# Patient Record
Sex: FEMALE | Race: WHITE | NOT HISPANIC OR LATINO | Employment: UNEMPLOYED | URBAN - METROPOLITAN AREA
[De-identification: names, ages, dates, MRNs, and addresses within clinical notes are randomized per-mention and may not be internally consistent; named-entity substitution may affect disease eponyms.]

---

## 2019-09-18 PROCEDURE — 82360 CALCULUS ASSAY QUANT: CPT

## 2019-09-19 ENCOUNTER — LAB REQUISITION (OUTPATIENT)
Dept: LAB | Facility: HOSPITAL | Age: 54
End: 2019-09-19

## 2019-09-19 DIAGNOSIS — N20.9 URINARY CALCULUS: ICD-10-CM

## 2019-09-26 LAB
CA PHOS CRY STONE QL IR: 20 %
CAHPO4 CRY STONE QL IR: 65 %
COD CRY STONE QL IR: 5 %
COLOR STONE: NORMAL
COM CRY STONE QL IR: 10 %
COMPN STONE: NORMAL
Lab: NORMAL
Lab: NORMAL
NIDUS STONE QL: NORMAL
PATH REPORT.COMMENTS IMP SPEC: NORMAL
SIZE STONE: NORMAL MM
SURFACE CRYSTALS: NORMAL
WT STONE: 230.3 MG

## 2019-10-17 NOTE — TELEPHONE ENCOUNTER
Pt has an appointment coming up in December and requesting refill on Emgality. All previous records have been faxed and are in chart.     Please advise and refill medication.    Thank you.

## 2019-10-17 NOTE — TELEPHONE ENCOUNTER
Patient is needing refill on Emgality sent to [facility] in chart. Please call her with any issues. Patient is scheduled to FU in December.

## 2019-11-21 ENCOUNTER — RESULTS ENCOUNTER (OUTPATIENT)
Dept: FAMILY MEDICINE CLINIC | Facility: CLINIC | Age: 54
End: 2019-11-21

## 2019-11-21 ENCOUNTER — OFFICE VISIT (OUTPATIENT)
Dept: FAMILY MEDICINE CLINIC | Facility: CLINIC | Age: 54
End: 2019-11-21

## 2019-11-21 VITALS
HEART RATE: 81 BPM | TEMPERATURE: 98.6 F | SYSTOLIC BLOOD PRESSURE: 155 MMHG | DIASTOLIC BLOOD PRESSURE: 108 MMHG | RESPIRATION RATE: 16 BRPM | BODY MASS INDEX: 32.61 KG/M2 | HEIGHT: 64 IN | WEIGHT: 191 LBS | OXYGEN SATURATION: 100 %

## 2019-11-21 DIAGNOSIS — Z78.9 HEPATITIS B IMMUNE: ICD-10-CM

## 2019-11-21 DIAGNOSIS — R03.0 ELEVATED BLOOD PRESSURE READING: Primary | ICD-10-CM

## 2019-11-21 DIAGNOSIS — Z12.11 SCREENING FOR COLON CANCER: ICD-10-CM

## 2019-11-21 DIAGNOSIS — N20.0 KIDNEY STONE: ICD-10-CM

## 2019-11-21 DIAGNOSIS — Z13.220 SCREENING FOR LIPID DISORDERS: ICD-10-CM

## 2019-11-21 DIAGNOSIS — Z12.31 SCREENING MAMMOGRAM, ENCOUNTER FOR: ICD-10-CM

## 2019-11-21 PROBLEM — E66.9 OBESITY: Status: ACTIVE | Noted: 2019-11-21

## 2019-11-21 PROBLEM — N20.1 URETERAL STONE: Status: ACTIVE | Noted: 2019-09-14

## 2019-11-21 PROBLEM — J45.909 ASTHMA: Status: ACTIVE | Noted: 2019-11-21

## 2019-11-21 PROBLEM — R78.81 BACTEREMIA DUE TO GRAM-NEGATIVE BACTERIA: Status: ACTIVE | Noted: 2019-09-23

## 2019-11-21 PROBLEM — G43.909 MIGRAINE: Status: ACTIVE | Noted: 2019-11-21

## 2019-11-21 PROBLEM — N13.2 URETERAL STONE WITH HYDRONEPHROSIS: Status: ACTIVE | Noted: 2017-12-30

## 2019-11-21 PROBLEM — N13.30 HYDRONEPHROSIS: Status: ACTIVE | Noted: 2019-09-21

## 2019-11-21 PROBLEM — N13.30 HYDROURETERONEPHROSIS: Status: ACTIVE | Noted: 2019-09-14

## 2019-11-21 LAB
BASOPHILS # BLD AUTO: 0.02 10*3/MM3 (ref 0–0.2)
BASOPHILS NFR BLD AUTO: 0.4 % (ref 0–1.5)
DEPRECATED RDW RBC AUTO: 47.2 FL (ref 37–54)
EOSINOPHIL # BLD AUTO: 0.08 10*3/MM3 (ref 0–0.4)
EOSINOPHIL NFR BLD AUTO: 1.7 % (ref 0.3–6.2)
ERYTHROCYTE [DISTWIDTH] IN BLOOD BY AUTOMATED COUNT: 15.2 % (ref 12.3–15.4)
HCT VFR BLD AUTO: 42.8 % (ref 34–46.6)
HGB BLD-MCNC: 13.6 G/DL (ref 12–15.9)
IMM GRANULOCYTES # BLD AUTO: 0.01 10*3/MM3 (ref 0–0.05)
IMM GRANULOCYTES NFR BLD AUTO: 0.2 % (ref 0–0.5)
LYMPHOCYTES # BLD AUTO: 1.49 10*3/MM3 (ref 0.7–3.1)
LYMPHOCYTES NFR BLD AUTO: 32.5 % (ref 19.6–45.3)
MCH RBC QN AUTO: 27.2 PG (ref 26.6–33)
MCHC RBC AUTO-ENTMCNC: 31.8 G/DL (ref 31.5–35.7)
MCV RBC AUTO: 85.6 FL (ref 79–97)
MONOCYTES # BLD AUTO: 0.42 10*3/MM3 (ref 0.1–0.9)
MONOCYTES NFR BLD AUTO: 9.2 % (ref 5–12)
NEUTROPHILS # BLD AUTO: 2.57 10*3/MM3 (ref 1.7–7)
NEUTROPHILS NFR BLD AUTO: 56 % (ref 42.7–76)
NRBC BLD AUTO-RTO: 0 /100 WBC (ref 0–0.2)
PLATELET # BLD AUTO: 320 10*3/MM3 (ref 140–450)
PMV BLD AUTO: 10.2 FL (ref 6–12)
RBC # BLD AUTO: 5 10*6/MM3 (ref 3.77–5.28)
WBC NRBC COR # BLD: 4.59 10*3/MM3 (ref 3.4–10.8)

## 2019-11-21 PROCEDURE — 36415 COLL VENOUS BLD VENIPUNCTURE: CPT | Performed by: FAMILY MEDICINE

## 2019-11-21 PROCEDURE — 80053 COMPREHEN METABOLIC PANEL: CPT | Performed by: FAMILY MEDICINE

## 2019-11-21 PROCEDURE — 99203 OFFICE O/P NEW LOW 30 MIN: CPT | Performed by: FAMILY MEDICINE

## 2019-11-21 PROCEDURE — 86706 HEP B SURFACE ANTIBODY: CPT | Performed by: FAMILY MEDICINE

## 2019-11-21 PROCEDURE — 85025 COMPLETE CBC W/AUTO DIFF WBC: CPT | Performed by: FAMILY MEDICINE

## 2019-11-21 PROCEDURE — 80061 LIPID PANEL: CPT | Performed by: FAMILY MEDICINE

## 2019-11-21 RX ORDER — ALBUTEROL SULFATE 90 UG/1
AEROSOL, METERED RESPIRATORY (INHALATION)
COMMUNITY
End: 2019-11-21 | Stop reason: SDUPTHER

## 2019-11-21 RX ORDER — HYDROCODONE BITARTRATE AND ACETAMINOPHEN 7.5; 325 MG/1; MG/1
TABLET ORAL
Refills: 0 | COMMUNITY
Start: 2019-10-23 | End: 2019-11-21

## 2019-11-21 RX ORDER — MONTELUKAST SODIUM 10 MG/1
10 TABLET ORAL DAILY
Qty: 90 TABLET | Refills: 1 | Status: SHIPPED | OUTPATIENT
Start: 2019-11-21 | End: 2020-07-20

## 2019-11-21 RX ORDER — PHENAZOPYRIDINE HYDROCHLORIDE 200 MG/1
TABLET, FILM COATED ORAL
COMMUNITY
Start: 2019-09-14 | End: 2019-11-21

## 2019-11-21 RX ORDER — ONDANSETRON 4 MG/1
4 TABLET, FILM COATED ORAL EVERY 8 HOURS PRN
Refills: 1 | COMMUNITY
Start: 2019-10-23 | End: 2019-11-21

## 2019-11-21 RX ORDER — ALBUTEROL SULFATE 90 UG/1
2 AEROSOL, METERED RESPIRATORY (INHALATION) EVERY 6 HOURS PRN
Qty: 18 G | Refills: 0 | Status: SHIPPED | OUTPATIENT
Start: 2019-11-21 | End: 2022-01-25 | Stop reason: SDUPTHER

## 2019-11-21 RX ORDER — CRANBERRY FRUIT EXTRACT 650 MG
2 CAPSULE ORAL 2 TIMES DAILY
COMMUNITY

## 2019-11-21 RX ORDER — MONTELUKAST SODIUM 10 MG/1
1 TABLET ORAL DAILY
COMMUNITY
Start: 2018-10-14 | End: 2019-11-21 | Stop reason: SDUPTHER

## 2019-11-21 RX ORDER — DIPHENHYDRAMINE HCL 25 MG
CAPSULE ORAL
COMMUNITY
End: 2019-11-21

## 2019-11-21 NOTE — PROGRESS NOTES
Subjective   Stormy Morrow is a 54 y.o. female.     Chief Complaint   Patient presents with   • Blood Infection     Patient states that she recently had sepsis and she is F/U from that. She is a new patient today. Her previous doctor was Lory Irizarry in [place].    • Headache   • Flank Pain   • Allergies         Current Outpatient Medications:   •  albuterol sulfate  (90 Base) MCG/ACT inhaler, Inhale 2 puffs Every 6 (Six) Hours As Needed for Wheezing or Shortness of Air., Disp: 18 g, Rfl: 0  •  Cetirizine HCl 10 MG capsule, Take 1 capsule by mouth Daily., Disp: , Rfl:   •  fluticasone (VERAMYST) 27.5 MCG/SPRAY nasal spray, 2 sprays into the nostril(s) as directed by provider Daily., Disp: 30 g, Rfl: 2  •  galcanezumab-gnlm (EMGALITY) 120 MG/ML prefilled syringe, Inject 1 mL under the skin into the appropriate area as directed Every 30 (Thirty) Days., Disp: 1 mL, Rfl: 1  •  montelukast (SINGULAIR) 10 MG tablet, Take 1 tablet by mouth Daily., Disp: 90 tablet, Rfl: 1  •  Nutritional Supplements (THERALITH XR) tablet, Take 2 tablets by mouth 2 (Two) Times a Day., Disp: , Rfl:     Past Medical History:   Diagnosis Date   • Allergic    • Asthma 11/21/2019   • Bell palsy     Rt Side   • Heart murmur    • Kidney stone    • MAMMO     NEG = 2018   • Migraine     MIGRAINES   • Obesity 11/21/2019   • PAP     NEG = 2018   • Raynaud phenomenon        Past Surgical History:   Procedure Laterality Date   • APPENDECTOMY  07/2000   • CYSTOSCOPY BLADDER STONE LITHOTRIPSY  09/2019       Family History   Problem Relation Age of Onset   • Arthritis Mother    • Osteoporosis Mother    • Stroke Mother    • Hypertension Mother    • Arthritis Father    • Mental illness Father         PTSD   • Hyperlipidemia Father    • Kidney disease Father         RTA   • Cancer Father         Tongue Cancer   • Obesity Father    • Hypertension Father    • Kidney nephrosis Father    • Hypertension Brother    • Kidney nephrosis Brother    •  Migraines Daughter    • Hyperlipidemia Son    • Kidney disease Son    • Migraines Maternal Aunt    • Hyperlipidemia Maternal Uncle    • Heart attack Maternal Uncle    • Arthritis Maternal Grandmother    • Cancer Maternal Grandmother    • Obesity Maternal Grandmother    • Cancer Maternal Grandfather    • Stroke Paternal Grandfather        Social History     Socioeconomic History   • Marital status:      Spouse name: Not on file   • Number of children: Not on file   • Years of education: Not on file   • Highest education level: Not on file   Tobacco Use   • Smoking status: Never Smoker   • Smokeless tobacco: Never Used   Substance and Sexual Activity   • Alcohol use: No     Frequency: Never   • Drug use: No   • Sexual activity: Defer       58y/o C female here to [facility] care w/ hx/o recurrent Kidney stones/ Migraines/ Raynauds/ Allergies/ Bell's Palsy    Pt w/ recent hx/o sepsis ([place]) due to kidney stones and needing a PCP    UROLOGY---Dr. Frost  NEURO---Dr. Dasilva           The following portions of the patient's history were reviewed and updated as appropriate: allergies, current medications, past family history, past medical history, past social history, past surgical history and problem list.    Review of Systems   Constitutional: Negative for activity change, fatigue and unexpected weight gain.   Respiratory: Negative for cough, chest tightness and shortness of breath.    Cardiovascular: Negative for chest pain, palpitations and leg swelling.   Genitourinary: Negative for difficulty urinating, flank pain, frequency, hematuria, urgency and urinary incontinence.   Musculoskeletal: Negative for arthralgias and myalgias.   Allergic/Immunologic: Positive for environmental allergies.   Neurological: Positive for facial asymmetry (Rt sided Bell's Palsy Hx) and headache. Negative for dizziness, speech difficulty, weakness, light-headedness, memory problem and confusion.   Psychiatric/Behavioral: Negative for sleep  disturbance.       Vitals:    11/21/19 1004   BP: (!) 155/108   Pulse: 81   Resp: 16   Temp: 98.6 °F (37 °C)   SpO2: 100%       Objective   Physical Exam   Constitutional: She is oriented to person, place, and time. She appears well-developed and well-nourished.   HENT:   Head: Normocephalic and atraumatic.   Neck: Normal range of motion. Neck supple.   Cardiovascular: Normal rate, regular rhythm, normal heart sounds and intact distal pulses.   No murmur heard.  Pulmonary/Chest: Effort normal and breath sounds normal. No respiratory distress.   Musculoskeletal: She exhibits no edema.   Neurological: She is alert and oriented to person, place, and time. No cranial nerve deficit.   Skin: Skin is warm and dry. No rash noted.   Psychiatric: She has a normal mood and affect. Her behavior is normal. Judgment and thought content normal.   Nursing note and vitals reviewed.        Assessment/Plan   Stormy was seen today for blood infection, headache, flank pain and allergies.    Diagnoses and all orders for this visit:    Elevated blood pressure reading  -     Comprehensive Metabolic Panel  -     CBC & Differential  -     CBC Auto Differential    Hepatitis B immune  -     Hepatitis B Surface Antibody    Kidney stone    Screening mammogram, encounter for  -     Mammo Screening Digital Tomosynthesis Bilateral With CAD; Future    Screening for colon cancer  -     Cologuard - Stool, Per Rectum; Future    Screening for lipid disorders  -     Comprehensive Metabolic Panel  -     Lipid Panel    Other orders  -     fluticasone (VERAMYST) 27.5 MCG/SPRAY nasal spray; 2 sprays into the nostril(s) as directed by provider Daily.  -     montelukast (SINGULAIR) 10 MG tablet; Take 1 tablet by mouth Daily.  -     albuterol sulfate  (90 Base) MCG/ACT inhaler; Inhale 2 puffs Every 6 (Six) Hours As Needed for Wheezing or Shortness of Air.

## 2019-11-22 LAB
ALBUMIN SERPL-MCNC: 4.3 G/DL (ref 3.5–5.2)
ALBUMIN/GLOB SERPL: 1.4 G/DL
ALP SERPL-CCNC: 81 U/L (ref 39–117)
ALT SERPL W P-5'-P-CCNC: 21 U/L (ref 1–33)
ANION GAP SERPL CALCULATED.3IONS-SCNC: 11.1 MMOL/L (ref 5–15)
AST SERPL-CCNC: 21 U/L (ref 1–32)
BILIRUB SERPL-MCNC: 0.3 MG/DL (ref 0.2–1.2)
BUN BLD-MCNC: 11 MG/DL (ref 6–20)
BUN/CREAT SERPL: 12.5 (ref 7–25)
CALCIUM SPEC-SCNC: 9.2 MG/DL (ref 8.6–10.5)
CHLORIDE SERPL-SCNC: 107 MMOL/L (ref 98–107)
CHOLEST SERPL-MCNC: 191 MG/DL (ref 0–200)
CO2 SERPL-SCNC: 25.9 MMOL/L (ref 22–29)
CREAT BLD-MCNC: 0.88 MG/DL (ref 0.57–1)
GFR SERPL CREATININE-BSD FRML MDRD: 67 ML/MIN/1.73
GLOBULIN UR ELPH-MCNC: 3.1 GM/DL
GLUCOSE BLD-MCNC: 96 MG/DL (ref 65–99)
HBV SURFACE AB SER RIA-ACNC: REACTIVE
HDLC SERPL-MCNC: 59 MG/DL (ref 40–60)
LDLC SERPL CALC-MCNC: 122 MG/DL (ref 0–100)
LDLC/HDLC SERPL: 2.06 {RATIO}
POTASSIUM BLD-SCNC: 4.9 MMOL/L (ref 3.5–5.2)
PROT SERPL-MCNC: 7.4 G/DL (ref 6–8.5)
SODIUM BLD-SCNC: 144 MMOL/L (ref 136–145)
TRIGL SERPL-MCNC: 52 MG/DL (ref 0–150)
VLDLC SERPL-MCNC: 10.4 MG/DL (ref 5–40)

## 2019-11-23 PROBLEM — G51.0 BELL PALSY: Status: ACTIVE | Noted: 2019-11-23

## 2019-11-23 PROBLEM — N20.0 KIDNEY STONE: Status: ACTIVE | Noted: 2019-11-23

## 2019-12-03 ENCOUNTER — OFFICE VISIT (OUTPATIENT)
Dept: NEUROLOGY | Facility: CLINIC | Age: 54
End: 2019-12-03

## 2019-12-03 VITALS
HEIGHT: 64 IN | HEART RATE: 81 BPM | WEIGHT: 190 LBS | SYSTOLIC BLOOD PRESSURE: 126 MMHG | DIASTOLIC BLOOD PRESSURE: 74 MMHG | BODY MASS INDEX: 32.44 KG/M2 | OXYGEN SATURATION: 98 %

## 2019-12-03 DIAGNOSIS — G43.109 MIGRAINE WITH AURA AND WITHOUT STATUS MIGRAINOSUS, NOT INTRACTABLE: Primary | ICD-10-CM

## 2019-12-03 PROCEDURE — 99212 OFFICE O/P EST SF 10 MIN: CPT | Performed by: PSYCHIATRY & NEUROLOGY

## 2019-12-03 NOTE — PROGRESS NOTES
Chief Complaint   Patient presents with   • Migraine       Patient ID: Stormy Morrow is a 54 y.o. female.    HPI: I have had the pleasure of seeing your patient today.  As you may know Stormy is a 54-year-old female with a history of migraine headache.  She is currently on Emgality q. monthly.  She has had a lot of success with that from a headache frequency standpoint.  She states that she has had only a few days of headaches within the last few months.  She typically will take naproxen as abortive treatment which is helpful in aborting headaches for her.  She is also taking magnesium as well as vitamin B2.  She denies any new headache symptoms.      The following portions of the patient's history were reviewed and updated as appropriate: allergies, current medications, past family history, past medical history, past social history, past surgical history and problem list.    Review of Systems   Constitutional: Positive for fatigue. Negative for activity change and appetite change.   HENT: Negative for ear pain, hearing loss and trouble swallowing.    Eyes: Positive for photophobia (during migraines ) and visual disturbance (aura).   Respiratory: Positive for shortness of breath. Negative for chest tightness and wheezing.    Gastrointestinal: Negative for abdominal pain, nausea and vomiting.   Endocrine: Positive for cold intolerance. Negative for heat intolerance and polydipsia.   Musculoskeletal: Positive for back pain and neck stiffness. Negative for neck pain.   Skin: Negative for color change, rash and wound.   Allergic/Immunologic: Positive for environmental allergies. Negative for food allergies and immunocompromised state.   Neurological: Positive for facial asymmetry (bell palsy) and headaches (chronic migraines. had a 100 day migraine free and pt states she had her first last week). Negative for dizziness, tremors, seizures, syncope, speech difficulty, weakness, light-headedness and numbness.    Hematological: Negative for adenopathy. Does not bruise/bleed easily.   Psychiatric/Behavioral: Negative for agitation, behavioral problems, confusion, decreased concentration, dysphoric mood, hallucinations, self-injury, sleep disturbance and suicidal ideas. The patient is not nervous/anxious and is not hyperactive.       I reviewed and agree with the above review of systems completed by the medical assistant.    Vitals:    19 1307   BP: 126/74   Pulse: 81   SpO2: 98%       Neurologic Exam     Mental Status   Oriented to person, place, and time.   Concentration: normal.   Level of consciousness: alert  Knowledge: consistent with education (No deficits found.).     Cranial Nerves     CN II   Visual fields full to confrontation.     CN III, IV, VI   Pupils are equal, round, and reactive to light.  Extraocular motions are normal.   CN III: no CN III palsy  CN VI: no CN VI palsy    CN V   Facial sensation intact.     CN VII   Facial expression full, symmetric.     CN VIII   CN VIII normal.     CN IX, X   CN IX normal.   CN X normal.     CN XI   CN XI normal.     CN XII   CN XII normal.     Motor Exam     Strength   Right neck flexion: 5/5  Left neck flexion: 5/5  Right neck extension: 5/5  Left neck extension: 5/5  Right deltoid: 5/5  Left deltoid: 5/5  Right biceps: 5/5  Left biceps: 5/5  Right triceps: 5/5  Left triceps: 5/5  Right wrist flexion: 5/5  Left wrist flexion: 5/5  Right wrist extension: 5/5  Left wrist extension: 5/5  Right interossei: 5/5  Left interossei: 5/5  Right abdominals: 5/5  Left abdominals: 5/5  Right iliopsoas: 5/5  Left iliopsoas: 5/5  Right quadriceps: 5/5  Left quadriceps: 5/5  Right hamstrin/5  Left hamstrin/5  Right glutei: 5/5  Left glutei: 5/5  Right anterior tibial: 5/5  Left anterior tibial: 5/5  Right posterior tibial: 5/5  Left posterior tibial: 5/5  Right peroneal: 5/5  Left peroneal: 5/5  Right gastroc: 5/5  Left gastroc: 5/5    Sensory Exam   Light touch normal.    Vibration normal.     Gait, Coordination, and Reflexes     Gait  Gait: normal    Reflexes   Right brachioradialis: 2+  Left brachioradialis: 2+  Right biceps: 2+  Left biceps: 2+  Right triceps: 2+  Left triceps: 2+  Right patellar: 2+  Left patellar: 2+  Right achilles: 2+  Left achilles: 2+  Right : 2+  Left : 2+Station is normal.       Physical Exam   Constitutional: She is oriented to person, place, and time. She appears well-developed and well-nourished.   HENT:   Head: Normocephalic and atraumatic.   Eyes: EOM are normal. Pupils are equal, round, and reactive to light.   Cardiovascular: Normal rate and regular rhythm.   Pulmonary/Chest: Breath sounds normal.   Musculoskeletal: Normal range of motion.   Neurological: She is oriented to person, place, and time. Gait normal.   Reflex Scores:       Tricep reflexes are 2+ on the right side and 2+ on the left side.       Bicep reflexes are 2+ on the right side and 2+ on the left side.       Brachioradialis reflexes are 2+ on the right side and 2+ on the left side.       Patellar reflexes are 2+ on the right side and 2+ on the left side.       Achilles reflexes are 2+ on the right side and 2+ on the left side.  Skin: Skin is warm.   Vitals reviewed.      Procedures    Assessment/Plan: We will continue her current treatment regimen with Emgality along with the naproxen.  Return to clinic in 6 months.       Stormy was seen today for migraine.    Diagnoses and all orders for this visit:    Migraine with aura and without status migrainosus, not intractable           Chuy Dasilva II, MD

## 2019-12-09 ENCOUNTER — OFFICE VISIT (OUTPATIENT)
Dept: FAMILY MEDICINE CLINIC | Facility: CLINIC | Age: 54
End: 2019-12-09

## 2019-12-09 VITALS
SYSTOLIC BLOOD PRESSURE: 146 MMHG | WEIGHT: 191 LBS | HEART RATE: 73 BPM | DIASTOLIC BLOOD PRESSURE: 99 MMHG | HEIGHT: 64 IN | TEMPERATURE: 98.7 F | OXYGEN SATURATION: 100 % | BODY MASS INDEX: 32.61 KG/M2 | RESPIRATION RATE: 16 BRPM

## 2019-12-09 DIAGNOSIS — R03.0 ELEVATED BLOOD PRESSURE READING: ICD-10-CM

## 2019-12-09 DIAGNOSIS — J45.20 MILD INTERMITTENT ASTHMA WITHOUT COMPLICATION: ICD-10-CM

## 2019-12-09 DIAGNOSIS — Z00.00 PREVENTATIVE HEALTH CARE: Primary | ICD-10-CM

## 2019-12-09 LAB
BILIRUB BLD-MCNC: NEGATIVE MG/DL
CLARITY, POC: CLEAR
COLOR UR: YELLOW
GLUCOSE UR STRIP-MCNC: NEGATIVE MG/DL
KETONES UR QL: NEGATIVE
LEUKOCYTE EST, POC: NEGATIVE
NITRITE UR-MCNC: NEGATIVE MG/ML
PH UR: 6.5 [PH] (ref 5–8)
PROT UR STRIP-MCNC: NEGATIVE MG/DL
RBC # UR STRIP: NEGATIVE /UL
SP GR UR: 1.02 (ref 1–1.03)
UROBILINOGEN UR QL: NORMAL

## 2019-12-09 PROCEDURE — 99396 PREV VISIT EST AGE 40-64: CPT | Performed by: FAMILY MEDICINE

## 2019-12-09 PROCEDURE — 81003 URINALYSIS AUTO W/O SCOPE: CPT | Performed by: FAMILY MEDICINE

## 2019-12-09 NOTE — PROGRESS NOTES
Subjective   Stormy Morrow is a 54 y.o. female.     Chief Complaint   Patient presents with   • Annual Exam     physical with papsmear         Current Outpatient Medications:   •  albuterol sulfate  (90 Base) MCG/ACT inhaler, Inhale 2 puffs Every 6 (Six) Hours As Needed for Wheezing or Shortness of Air., Disp: 18 g, Rfl: 0  •  Cetirizine HCl 10 MG capsule, Take 1 capsule by mouth Daily., Disp: , Rfl:   •  fluticasone (VERAMYST) 27.5 MCG/SPRAY nasal spray, 2 sprays into the nostril(s) as directed by provider Daily., Disp: 30 g, Rfl: 2  •  galcanezumab-gnlm (EMGALITY) 120 MG/ML prefilled syringe, Inject 1 mL under the skin into the appropriate area as directed Every 30 (Thirty) Days., Disp: 1 mL, Rfl: 1  •  Magnesium 100 MG capsule, Take  by mouth., Disp: , Rfl:   •  montelukast (SINGULAIR) 10 MG tablet, Take 1 tablet by mouth Daily., Disp: 90 tablet, Rfl: 1  •  Nutritional Supplements (THERALITH XR) tablet, Take 2 tablets by mouth 2 (Two) Times a Day., Disp: , Rfl:     Past Medical History:   Diagnosis Date   • Allergic    • Asthma 11/21/2019   • Bell palsy     Rt Side   • Heart murmur    • Kidney stone    • MAMMO     NEG = 2018   • Migraine     MIGRAINES   • Obesity 11/21/2019   • PAP     NEG = 2018   • Raynaud phenomenon        Past Surgical History:   Procedure Laterality Date   • APPENDECTOMY  07/2000   • APPENDECTOMY      20 years ago    • COLONOSCOPY      Cologuard ----2019   • CYSTOSCOPY BLADDER STONE LITHOTRIPSY  09/2019    x 2       Family History   Problem Relation Age of Onset   • Arthritis Mother    • Osteoporosis Mother    • Stroke Mother    • Hypertension Mother    • Arthritis Father    • Mental illness Father         PTSD   • Hyperlipidemia Father    • Kidney disease Father         RTA   • Cancer Father         Tongue Cancer   • Obesity Father    • Hypertension Father    • Kidney nephrosis Father    • Hypertension Brother    • Kidney nephrosis Brother    • Migraines Daughter    •  Hyperlipidemia Son    • Kidney disease Son    • Migraines Maternal Aunt    • Hyperlipidemia Maternal Uncle    • Heart attack Maternal Uncle    • Arthritis Maternal Grandmother    • Cancer Maternal Grandmother    • Obesity Maternal Grandmother    • Cancer Maternal Grandfather    • Stroke Paternal Grandfather        Social History     Socioeconomic History   • Marital status:      Spouse name: Not on file   • Number of children: Not on file   • Years of education: Not on file   • Highest education level: Not on file   Tobacco Use   • Smoking status: Never Smoker   • Smokeless tobacco: Never Used   Substance and Sexual Activity   • Alcohol use: No     Frequency: Never   • Drug use: No   • Sexual activity: Defer       53 y/o C female here for annual PE w/ pap    BP good @ [facility]UR[facility] appt last week       The following portions of the patient's history were reviewed and updated as appropriate: allergies, current medications, past family history, past medical history, past social history, past surgical history and problem list.    Review of Systems   Constitutional: Negative for activity change, appetite change, fatigue, fever, unexpected weight gain and unexpected weight loss.   HENT: Negative for congestion, ear pain, hearing loss, nosebleeds, postnasal drip, rhinorrhea, sinus pressure, sneezing, sore throat, tinnitus and trouble swallowing.    Eyes: Negative for blurred vision and double vision.   Respiratory: Negative for cough and shortness of breath.    Cardiovascular: Negative for chest pain.   Gastrointestinal: Negative for abdominal pain, constipation, diarrhea, nausea, vomiting, GERD and indigestion.   Genitourinary: Negative for breast lump, breast pain, difficulty urinating, dysuria, flank pain, frequency, hematuria, urgency, urinary incontinence and vaginal discharge.   Musculoskeletal: Negative for arthralgias, back pain and myalgias.   Skin: Negative for rash and skin lesions.   Neurological: Negative for  weakness, memory problem and confusion.   Psychiatric/Behavioral: Negative for agitation, self-injury, suicidal ideas, depressed mood and stress. The patient is not nervous/anxious.        Vitals:    12/09/19 0915   BP: 146/99   Pulse: 73   Resp: 16   Temp: 98.7 °F (37.1 °C)   SpO2: 100%       Objective   Physical Exam   Constitutional: She is oriented to person, place, and time. She appears well-developed and well-nourished.   HENT:   Head: Normocephalic and atraumatic.   Right Ear: External ear normal.   Left Ear: External ear normal.   Nose: Nose normal.   Mouth/Throat: Oropharynx is clear and moist.   Eyes: Pupils are equal, round, and reactive to light. Conjunctivae and EOM are normal.   Neck: Normal range of motion. Neck supple. No thyromegaly present.   Cardiovascular: Normal rate, regular rhythm, normal heart sounds and intact distal pulses.   No murmur heard.  Pulmonary/Chest: Effort normal and breath sounds normal. No stridor. No respiratory distress. She has no wheezes. She has no rales.   Abdominal: Soft. Bowel sounds are normal. She exhibits no distension and no mass. There is no tenderness. There is no rebound and no guarding. No hernia. Hernia confirmed negative in the right inguinal area and confirmed negative in the left inguinal area.   Genitourinary: Vagina normal, uterus normal and cervix normal. Pelvic exam was performed with patient supine. There is no rash, lesion or Bartholin's cyst on the right labia. There is no rash, lesion or Bartholin's cyst on the left labia. Uterus is midaxial. Right adnexum displays no mass, no tenderness and no fullness. Left adnexum displays no mass, no tenderness and no fullness. Vagina exhibits no lesion and no loss of rugae. No erythema, tenderness or bleeding in the vagina. No foreign body in the vagina. No vaginal discharge found. No cystocele or rectocele present.  Musculoskeletal: Normal range of motion. She exhibits no edema or tenderness.   Lymphadenopathy:      She has no cervical adenopathy.        Right: No inguinal adenopathy present.        Left: No inguinal adenopathy present.   Neurological: She is alert and oriented to person, place, and time. She displays normal reflexes. No cranial nerve deficit. She exhibits normal muscle tone.   Skin: Skin is warm and dry. Capillary refill takes less than 2 seconds. No rash noted. No erythema.   Psychiatric: She has a normal mood and affect. Judgment and thought content normal.   Nursing note and vitals reviewed.        Assessment/Plan   Stormy was seen today for annual exam.    Diagnoses and all orders for this visit:    Preventative health care  -     POCT urinalysis dipstick, automated  -     PapIG HPV Age Gdln ACOG; Future  -     PapIG HPV Age Gdln ACOG    Elevated blood pressure reading    Mild intermittent asthma without complication    Disc'd healthy eating and exercise

## 2019-12-10 ENCOUNTER — HOSPITAL ENCOUNTER (OUTPATIENT)
Dept: MAMMOGRAPHY | Facility: HOSPITAL | Age: 54
Discharge: HOME | End: 2019-12-10
Admitting: FAMILY MEDICINE

## 2019-12-10 DIAGNOSIS — Z12.31 SCREENING MAMMOGRAM, ENCOUNTER FOR: ICD-10-CM

## 2019-12-10 PROCEDURE — 77063 BREAST TOMOSYNTHESIS BI: CPT

## 2019-12-10 PROCEDURE — 77067 SCR MAMMO BI INCL CAD: CPT

## 2019-12-11 ENCOUNTER — HOSPITAL ENCOUNTER (OUTPATIENT)
Dept: OTHER | Facility: HOSPITAL | Age: 54
Discharge: HOME | End: 2019-12-11

## 2019-12-11 DIAGNOSIS — Z00.6 EXAMINATION FOR NORMAL COMPARISON OR CONTROL IN CLINICAL RESEARCH: ICD-10-CM

## 2019-12-11 LAB
AGE GDLN ACOG TESTING: NORMAL
CHROM ANALY OVERALL INTERP-IMP: NORMAL
CONV .: NORMAL
CONV PERFORMED BY:: NORMAL
DX ICD CODE: NORMAL
HIV 1 & 2 AB SER-IMP: NORMAL
HPV I/H RISK 1 DNA CVX QL PROBE+SIG AMP: NEGATIVE
REF LAB TEST METHOD: NORMAL
STAT OF ADQ CVX/VAG CYTO-IMP: NORMAL

## 2019-12-19 ENCOUNTER — TELEPHONE (OUTPATIENT)
Dept: NEUROLOGY | Facility: CLINIC | Age: 54
End: 2019-12-19

## 2019-12-19 DIAGNOSIS — G43.109 MIGRAINE WITH AURA AND WITHOUT STATUS MIGRAINOSUS, NOT INTRACTABLE: Primary | ICD-10-CM

## 2019-12-19 NOTE — TELEPHONE ENCOUNTER
Pt requesting Emgality 120mg/ml injection. Inject 1 ML sub q monthly.    Medication sent to local [facility].

## 2020-06-16 DIAGNOSIS — G43.109 MIGRAINE WITH AURA, NOT INTRACTABLE, WITHOUT STATUS MIGRAINOSUS: ICD-10-CM

## 2020-06-16 RX ORDER — GALCANEZUMAB 120 MG/ML
INJECTION, SOLUTION SUBCUTANEOUS
Qty: 1 PEN | Refills: 1 | Status: SHIPPED | OUTPATIENT
Start: 2020-06-16 | End: 2020-08-04

## 2020-07-08 ENCOUNTER — OFFICE VISIT (OUTPATIENT)
Dept: NEUROLOGY | Facility: CLINIC | Age: 55
End: 2020-07-08

## 2020-07-08 VITALS — BODY MASS INDEX: 35.85 KG/M2 | HEIGHT: 64 IN | WEIGHT: 210 LBS

## 2020-07-08 DIAGNOSIS — G43.109 MIGRAINE WITH AURA, NOT INTRACTABLE, WITHOUT STATUS MIGRAINOSUS: Primary | ICD-10-CM

## 2020-07-08 PROCEDURE — 99213 OFFICE O/P EST LOW 20 MIN: CPT | Performed by: PSYCHIATRY & NEUROLOGY

## 2020-07-08 NOTE — PROGRESS NOTES
Chief Complaint   Patient presents with   • Migraine       Patient ID: Stormy Morrow is a 54 y.o. female.    HPI: I had the pleasure of seeing your patient today.  As you may know Stormy is a 54-year-old female with a history of migraine headache.  She is currently receiving Emgality injections monthly as preventative therapy.  This overall has helped with respect to her headache frequency and severity.  Within the last 30 days she suspects she has had about 5 to 10 days of headaches.  All these were migrainous.  She typically takes Aleve as abortive treatment.  The Aleve is not as effective in aborting the headache as it had been in the past.  She has tried rizatriptan, frovatriptan, Zomig orally as well as Toradol and Relpax in the past.  She denies any new headache symptoms.  Her headaches can last anywhere from 4 or more hours each.  She notes that weather changes as well as menstrual cycle can trigger headaches.  No new symptoms with respect to migraine onset currently.    The following portions of the patient's history were reviewed and updated as appropriate: allergies, current medications, past family history, past medical history, past social history, past surgical history and problem list.    Review of Systems   Constitutional: Negative for activity change, appetite change and fatigue.   HENT: Negative for facial swelling, hearing loss and trouble swallowing.    Eyes: Negative for photophobia, redness and visual disturbance.   Respiratory: Negative for chest tightness, shortness of breath and wheezing.    Cardiovascular: Negative for chest pain, palpitations and leg swelling.   Gastrointestinal: Negative for abdominal pain, nausea and vomiting.   Endocrine: Negative for cold intolerance, heat intolerance and polydipsia.   Musculoskeletal: Positive for gait problem (balance issues  with migraines). Negative for back pain and neck pain.   Skin: Negative for color change, rash and wound.    Allergic/Immunologic: Negative for environmental allergies, food allergies and immunocompromised state.   Neurological: Positive for weakness, numbness (side of right face. sometimes whole face) and headaches (pt states an incease in migraines). Negative for dizziness, tremors, seizures, syncope, facial asymmetry, speech difficulty and light-headedness.   Hematological: Negative for adenopathy. Does not bruise/bleed easily.   Psychiatric/Behavioral: Negative for agitation, behavioral problems, confusion, decreased concentration, dysphoric mood, hallucinations, self-injury, sleep disturbance and suicidal ideas. The patient is not nervous/anxious and is not hyperactive.       I have reviewed the review of systems above performed by my medical assistant.      There were no vitals filed for this visit.    Neurologic Exam     Mental Status   Oriented to person, place, and time.   Concentration: normal.   Level of consciousness: alert  Knowledge: consistent with education (No deficits found.).     Cranial Nerves     CN II   Visual fields full to confrontation.     CN III, IV, VI   Pupils are equal, round, and reactive to light.  Extraocular motions are normal.   CN III: no CN III palsy  CN VI: no CN VI palsy    CN V   Facial sensation intact.     CN VII   Facial expression full, symmetric.     CN VIII   CN VIII normal.     CN IX, X   CN IX normal.   CN X normal.     CN XI   CN XI normal.     CN XII   CN XII normal.     Motor Exam     Strength   Right neck flexion: 5/5  Left neck flexion: 5/5  Right neck extension: 5/5  Left neck extension: 5/5  Right deltoid: 5/5  Left deltoid: 5/5  Right biceps: 5/5  Left biceps: 5/5  Right triceps: 5/5  Left triceps: 5/5  Right wrist flexion: 5/5  Left wrist flexion: 5/5  Right wrist extension: 5/5  Left wrist extension: 5/5  Right interossei: 5/5  Left interossei: 5/5  Right abdominals: 5/5  Left abdominals: 5/5  Right iliopsoas: 5/5  Left iliopsoas: 5/5  Right quadriceps: 5/5  Left  quadriceps: 5/5  Right hamstrin/5  Left hamstrin/5  Right glutei: 5/5  Left glutei: 5/5  Right anterior tibial: 5/5  Left anterior tibial: 5/5  Right posterior tibial: 5/5  Left posterior tibial: 5/5  Right peroneal: 5/5  Left peroneal: 5/5  Right gastroc: 5/5  Left gastroc: 5/5    Sensory Exam   Light touch normal.   Vibration normal.     Gait, Coordination, and Reflexes     Gait  Gait: normal    Reflexes   Right brachioradialis: 2+  Left brachioradialis: 2+  Right biceps: 2+  Left biceps: 2+  Right triceps: 2+  Left triceps: 2+  Right patellar: 2+  Left patellar: 2+  Right achilles: 2+  Left achilles: 2+  Right : 2+  Left : 2+Station is normal.       Physical Exam   Constitutional: She is oriented to person, place, and time. She appears well-developed and well-nourished.   HENT:   Head: Normocephalic and atraumatic.   Eyes: Pupils are equal, round, and reactive to light. EOM are normal.   Cardiovascular: Normal rate and regular rhythm.   Pulmonary/Chest: Breath sounds normal.   Musculoskeletal: Normal range of motion.   Neurological: She is oriented to person, place, and time. Gait normal.   Reflex Scores:       Tricep reflexes are 2+ on the right side and 2+ on the left side.       Bicep reflexes are 2+ on the right side and 2+ on the left side.       Brachioradialis reflexes are 2+ on the right side and 2+ on the left side.       Patellar reflexes are 2+ on the right side and 2+ on the left side.       Achilles reflexes are 2+ on the right side and 2+ on the left side.  Skin: Skin is warm.   Vitals reviewed.      Procedures    Assessment/Plan: Our plan is to try Ubrelvy as abortive treatment for the migrainous headaches..  She will continue Emgality for now.  Return to clinic in 4 months or sooner if needed.       Stormy was seen today for migraine.    Diagnoses and all orders for this visit:    Migraine with aura, not intractable, without status migrainosus  -     ubrogepant (ubrogepant) 50 MG  tablet; Take 1 tablet by mouth 1 (One) Time As Needed (Migraine onset.) for up to 30 days. Repeat x1 in 2 hours if needed           Chuy Dasilva II, MD

## 2020-07-20 RX ORDER — MONTELUKAST SODIUM 10 MG/1
TABLET ORAL
Qty: 90 TABLET | Refills: 0 | Status: SHIPPED | OUTPATIENT
Start: 2020-07-20 | End: 2020-10-19

## 2020-07-29 ENCOUNTER — TELEPHONE (OUTPATIENT)
Dept: NEUROLOGY | Facility: CLINIC | Age: 55
End: 2020-07-29

## 2020-07-29 NOTE — TELEPHONE ENCOUNTER
I called and spoke to pt. I went over with her all steps of emgality pen. She states that it still  not working. I told her to try and call pharmacy to see if they can give you a new Injector pen because that one is malfunctioning. We currently do not have samples right now. After she speaks to the pharmacy she is to call back. She just picked up the script yesterday and I don't think insurance will pay for another pen at this time. However she is going to speak to pharmacy first and call back. Please advise thank you.

## 2020-07-29 NOTE — TELEPHONE ENCOUNTER
MARIANA  [phone]    OK TO LEAVE A MESSAGE    [facility] IS GOING TO SEND REPLACEMENT INJECTORS

## 2020-07-29 NOTE — TELEPHONE ENCOUNTER
Pt called  IN AND STATED WHEN SHE WAS TRYING TO ADMINISTER HER EMGALITY  IT DIDN'T WANT TO WORK SHE HAS BEEN USING IT FOR A YEAR WITHOUT INCIDENT .  DOES SHE NEED A NEW PRESCRIPTION OR CAN SHE GET A SAMPLER HER BEST CALL BACK NUMBER [phone]

## 2020-08-04 DIAGNOSIS — G43.109 MIGRAINE WITH AURA, NOT INTRACTABLE, WITHOUT STATUS MIGRAINOSUS: ICD-10-CM

## 2020-08-04 RX ORDER — GALCANEZUMAB 120 MG/ML
INJECTION, SOLUTION SUBCUTANEOUS
Qty: 1 PEN | Refills: 1 | Status: SHIPPED | OUTPATIENT
Start: 2020-08-04 | End: 2020-10-02

## 2020-09-09 PROCEDURE — 82365 CALCULUS SPECTROSCOPY: CPT | Performed by: UROLOGY

## 2020-09-10 ENCOUNTER — LAB REQUISITION (OUTPATIENT)
Dept: LAB | Facility: HOSPITAL | Age: 55
End: 2020-09-10

## 2020-09-10 DIAGNOSIS — N20.1 CALCULUS OF URETER: ICD-10-CM

## 2020-09-22 LAB
CA H2 PHOS DIHYD MFR STONE: 98 %
COLOR STONE: NORMAL
COMPN STONE: NORMAL
HYDROXYAPATITE 24H ENGDIFF UR: 2 %
LABORATORY COMMENT REPORT: NORMAL
Lab: NORMAL
Lab: NORMAL
PHOTO: NORMAL
SIZE STONE: NORMAL MM
SPEC SOURCE SUBJ: NORMAL
STONE ANALYSIS-IMP: NORMAL
WT STONE: 221 MG

## 2020-10-02 DIAGNOSIS — G43.109 MIGRAINE WITH AURA, NOT INTRACTABLE, WITHOUT STATUS MIGRAINOSUS: ICD-10-CM

## 2020-10-02 RX ORDER — GALCANEZUMAB 120 MG/ML
INJECTION, SOLUTION SUBCUTANEOUS
Qty: 1 PEN | Refills: 5 | Status: SHIPPED | OUTPATIENT
Start: 2020-10-02 | End: 2021-04-19

## 2020-10-19 DIAGNOSIS — Z13.220 SCREENING FOR LIPID DISORDERS: Primary | ICD-10-CM

## 2020-10-19 RX ORDER — MONTELUKAST SODIUM 10 MG/1
TABLET ORAL
Qty: 90 TABLET | Refills: 0 | Status: SHIPPED | OUTPATIENT
Start: 2020-10-19 | End: 2021-05-06 | Stop reason: SDUPTHER

## 2020-10-19 NOTE — TELEPHONE ENCOUNTER
Sent to [facility] but may want to  the order to take w/ her as well  Just checking LIPIDS since has had CMP done recently

## 2020-10-19 NOTE — TELEPHONE ENCOUNTER
Last visit:  12/9/19  Next visit: none  Last labs: 9/15/20    Rx requested: Montelukast   Pharmacy: [facility] in [place]

## 2020-11-10 ENCOUNTER — OFFICE VISIT (OUTPATIENT)
Dept: NEUROLOGY | Facility: CLINIC | Age: 55
End: 2020-11-10

## 2020-11-10 VITALS
BODY MASS INDEX: 32.27 KG/M2 | HEART RATE: 77 BPM | SYSTOLIC BLOOD PRESSURE: 126 MMHG | DIASTOLIC BLOOD PRESSURE: 84 MMHG | WEIGHT: 189 LBS | OXYGEN SATURATION: 98 % | HEIGHT: 64 IN

## 2020-11-10 DIAGNOSIS — G43.109 MIGRAINE WITH AURA, NOT INTRACTABLE, WITHOUT STATUS MIGRAINOSUS: Primary | ICD-10-CM

## 2020-11-10 PROCEDURE — 99214 OFFICE O/P EST MOD 30 MIN: CPT | Performed by: PSYCHIATRY & NEUROLOGY

## 2020-11-10 RX ORDER — ONDANSETRON 4 MG/1
TABLET, FILM COATED ORAL
COMMUNITY
Start: 2020-10-15 | End: 2021-03-16

## 2020-11-10 RX ORDER — HYDROCODONE BITARTRATE AND ACETAMINOPHEN 7.5; 325 MG/1; MG/1
TABLET ORAL
COMMUNITY
Start: 2020-10-15 | End: 2021-03-16

## 2020-11-10 NOTE — PROGRESS NOTES
"Chief Complaint   Patient presents with   • Migraine       Patient ID: Stormy Morrow is a 55 y.o. female.    HPI: I have had the pleasure of seeing your patient today.  As you may know she is a 55-year-old female with a history of migraine headaches.  She unfortunately suffered from several kidney stones recently as well as which she described as \"urosepsis\".  She therefore is unable to tell how many days of headache she has had within the last 30days due to being on pain medication.  She says that typically she will have approximately 5 days of migraine within a 30-day timeframe.  She is using Ubrelvy as abortive treatment and Emgality as prevention.  She usually can tell when the Emgality is wearing off when she is due for an injection.  She says that the Ubrelvy does help however does not effectively resolve the headache for her.  She denies any new symptoms or headaches.  No visual symptoms.  No focal weakness or numbness.    The following portions of the patient's history were reviewed and updated as appropriate: allergies, current medications, past family history, past medical history, past social history, past surgical history and problem list.    Review of Systems   Constitutional: Negative for activity change, appetite change and fatigue.   HENT: Negative for facial swelling, hearing loss and trouble swallowing.    Eyes: Negative for photophobia, redness and visual disturbance.   Respiratory: Negative for chest tightness, shortness of breath and wheezing.    Cardiovascular: Negative for chest pain, palpitations and leg swelling.   Gastrointestinal: Negative for abdominal pain, nausea and vomiting.   Neurological: Positive for headaches. Negative for dizziness, tremors, seizures, syncope, facial asymmetry, speech difficulty, weakness, light-headedness and numbness.   Hematological: Negative for adenopathy. Does not bruise/bleed easily.   Psychiatric/Behavioral: Negative for agitation, behavioral " problems, confusion, decreased concentration, dysphoric mood, hallucinations, self-injury, sleep disturbance and suicidal ideas. The patient is not nervous/anxious and is not hyperactive.       I have reviewed the review of systems above performed by my medical assistant.      Vitals:    11/10/20 1009   BP: 126/84   Pulse: 77   SpO2: 98%       Neurologic Exam     Mental Status   Oriented to person, place, and time.   Concentration: normal.   Level of consciousness: alert  Knowledge: consistent with education (No deficits found.).     Cranial Nerves     CN II   Visual fields full to confrontation.     CN III, IV, VI   Pupils are equal, round, and reactive to light.  Extraocular motions are normal.   CN III: no CN III palsy  CN VI: no CN VI palsy    CN V   Facial sensation intact.     CN VII   Facial expression full, symmetric.     CN VIII   CN VIII normal.     CN IX, X   CN IX normal.   CN X normal.     CN XI   CN XI normal.     CN XII   CN XII normal.     Motor Exam     Strength   Right neck flexion: 5/5  Left neck flexion: 5/5  Right neck extension: 5/5  Left neck extension: 5/5  Right deltoid: 5/5  Left deltoid: 5/5  Right biceps: 5/5  Left biceps: 5/5  Right triceps: 5/5  Left triceps: 5/5  Right wrist flexion: 5/5  Left wrist flexion: 5/5  Right wrist extension: 5/5  Left wrist extension: 5/5  Right interossei: 5/5  Left interossei: 5/5  Right abdominals: 5/5  Left abdominals: 5/5  Right iliopsoas: 5/5  Left iliopsoas: 5/5  Right quadriceps: 5/5  Left quadriceps: 5/5  Right hamstrin/5  Left hamstrin/5  Right glutei: 5/5  Left glutei: 5/5  Right anterior tibial: 5/5  Left anterior tibial: 5/5  Right posterior tibial: 5/5  Left posterior tibial: 5/5  Right peroneal: 5/5  Left peroneal: 5/5  Right gastroc: 5/5  Left gastroc: 5/5    Sensory Exam   Light touch normal.   Vibration normal.     Gait, Coordination, and Reflexes     Gait  Gait: normal    Reflexes   Right brachioradialis: 2+  Left brachioradialis:  2+  Right biceps: 2+  Left biceps: 2+  Right triceps: 2+  Left triceps: 2+  Right patellar: 2+  Left patellar: 2+  Right achilles: 2+  Left achilles: 2+  Right : 2+  Left : 2+Station is normal.       Physical Exam  Vitals signs reviewed.   Constitutional:       Appearance: She is well-developed.   HENT:      Head: Normocephalic and atraumatic.   Eyes:      Extraocular Movements: EOM normal.      Pupils: Pupils are equal, round, and reactive to light.   Cardiovascular:      Rate and Rhythm: Normal rate and regular rhythm.   Pulmonary:      Breath sounds: Normal breath sounds.   Musculoskeletal: Normal range of motion.   Skin:     General: Skin is warm.   Neurological:      Mental Status: She is oriented to person, place, and time.      Gait: Gait is intact.      Deep Tendon Reflexes:      Reflex Scores:       Tricep reflexes are 2+ on the right side and 2+ on the left side.       Bicep reflexes are 2+ on the right side and 2+ on the left side.       Brachioradialis reflexes are 2+ on the right side and 2+ on the left side.       Patellar reflexes are 2+ on the right side and 2+ on the left side.       Achilles reflexes are 2+ on the right side and 2+ on the left side.        Procedures    Assessment/Plan: I have given her samples of Nurtec to try as abortive treatment for her next migraine onset.  Along with that she will continue with Emgality injections q. monthly.  We will see her back in 4 months or sooner if needed.  A total of 25 minutes was spent face-to-face with the patient today.  Of that greater than 50% of this time was spent discussing signs and symptoms of migraine headache, patient education, plan of care and prognosis.       Diagnoses and all orders for this visit:    1. Migraine with aura, not intractable, without status migrainosus (Primary)           Chuy Dasilva II, MD

## 2020-11-18 ENCOUNTER — CLINICAL SUPPORT (OUTPATIENT)
Dept: FAMILY MEDICINE CLINIC | Facility: CLINIC | Age: 55
End: 2020-11-18

## 2020-11-18 DIAGNOSIS — Z13.220 SCREENING FOR LIPID DISORDERS: ICD-10-CM

## 2020-11-18 PROCEDURE — 80061 LIPID PANEL: CPT | Performed by: FAMILY MEDICINE

## 2020-11-18 PROCEDURE — 36415 COLL VENOUS BLD VENIPUNCTURE: CPT

## 2020-11-18 PROCEDURE — 36415 COLL VENOUS BLD VENIPUNCTURE: CPT | Performed by: FAMILY MEDICINE

## 2020-11-19 LAB
CHOLEST SERPL-MCNC: 182 MG/DL (ref 0–200)
HDLC SERPL-MCNC: 55 MG/DL (ref 40–60)
LDLC SERPL CALC-MCNC: 111 MG/DL (ref 0–100)
LDLC/HDLC SERPL: 1.99 {RATIO}
TRIGL SERPL-MCNC: 88 MG/DL (ref 0–150)
VLDLC SERPL-MCNC: 16 MG/DL (ref 5–40)

## 2021-01-08 ENCOUNTER — HOSPITAL ENCOUNTER (OUTPATIENT)
Dept: OTHER | Facility: HOSPITAL | Age: 56
Discharge: HOME | End: 2021-01-08
Attending: INTERNAL MEDICINE

## 2021-01-18 RX ORDER — FLUTICASONE FUROATE 27.5 UG/1
SPRAY, METERED NASAL
Qty: 10 G | Refills: 0 | Status: SHIPPED | OUTPATIENT
Start: 2021-01-18 | End: 2022-01-25 | Stop reason: SDUPTHER

## 2021-01-18 NOTE — TELEPHONE ENCOUNTER
Last visit: 12/9/19  Next visit: none  Last labs: 11/18/20    Rx requested: Flonase  Pharmacy: [facility] in [place]

## 2021-01-21 RX ORDER — FLUTICASONE FUROATE 27.5 UG/1
SPRAY, METERED NASAL
Qty: 27.3 ML | Refills: 2 | OUTPATIENT
Start: 2021-01-21

## 2021-02-03 ENCOUNTER — HOSPITAL ENCOUNTER (OUTPATIENT)
Dept: VACCINE CLINIC | Facility: HOSPITAL | Age: 56
Discharge: HOME | End: 2021-02-03
Attending: INTERNAL MEDICINE

## 2021-03-01 ENCOUNTER — APPOINTMENT (OUTPATIENT)
Dept: CT IMAGING | Facility: HOSPITAL | Age: 56
End: 2021-03-01

## 2021-03-01 ENCOUNTER — HOSPITAL ENCOUNTER (EMERGENCY)
Facility: HOSPITAL | Age: 56
Discharge: HOME | End: 2021-03-01
Admitting: EMERGENCY MEDICINE

## 2021-03-01 VITALS
WEIGHT: 197.75 LBS | BODY MASS INDEX: 33.76 KG/M2 | HEIGHT: 64 IN | TEMPERATURE: 98.1 F | SYSTOLIC BLOOD PRESSURE: 146 MMHG | RESPIRATION RATE: 14 BRPM | DIASTOLIC BLOOD PRESSURE: 94 MMHG | HEART RATE: 92 BPM | OXYGEN SATURATION: 97 %

## 2021-03-01 DIAGNOSIS — K12.1 ORAL INFLAMMATION: Primary | ICD-10-CM

## 2021-03-01 DIAGNOSIS — R20.2 NUMBNESS AND TINGLING SENSATION OF SKIN: ICD-10-CM

## 2021-03-01 DIAGNOSIS — R20.0 NUMBNESS AND TINGLING SENSATION OF SKIN: ICD-10-CM

## 2021-03-01 LAB
ANION GAP SERPL CALCULATED.3IONS-SCNC: 12 MMOL/L (ref 5–15)
BASOPHILS # BLD AUTO: 0 10*3/MM3 (ref 0–0.2)
BASOPHILS NFR BLD AUTO: 0.6 % (ref 0–1.5)
BUN SERPL-MCNC: 18 MG/DL (ref 6–20)
BUN/CREAT SERPL: 18.8 (ref 7–25)
CALCIUM SPEC-SCNC: 9.1 MG/DL (ref 8.6–10.5)
CHLORIDE SERPL-SCNC: 106 MMOL/L (ref 98–107)
CO2 SERPL-SCNC: 22 MMOL/L (ref 22–29)
CREAT SERPL-MCNC: 0.96 MG/DL (ref 0.57–1)
DEPRECATED RDW RBC AUTO: 45.5 FL (ref 37–54)
EOSINOPHIL # BLD AUTO: 0.2 10*3/MM3 (ref 0–0.4)
EOSINOPHIL NFR BLD AUTO: 4.3 % (ref 0.3–6.2)
ERYTHROCYTE [DISTWIDTH] IN BLOOD BY AUTOMATED COUNT: 14.3 % (ref 12.3–15.4)
GFR SERPL CREATININE-BSD FRML MDRD: 61 ML/MIN/1.73
GLUCOSE SERPL-MCNC: 82 MG/DL (ref 65–99)
HCT VFR BLD AUTO: 44.5 % (ref 34–46.6)
HGB BLD-MCNC: 14.9 G/DL (ref 12–15.9)
LYMPHOCYTES # BLD AUTO: 1.4 10*3/MM3 (ref 0.7–3.1)
LYMPHOCYTES NFR BLD AUTO: 27.4 % (ref 19.6–45.3)
MCH RBC QN AUTO: 29.6 PG (ref 26.6–33)
MCHC RBC AUTO-ENTMCNC: 33.4 G/DL (ref 31.5–35.7)
MCV RBC AUTO: 88.4 FL (ref 79–97)
MONOCYTES # BLD AUTO: 0.4 10*3/MM3 (ref 0.1–0.9)
MONOCYTES NFR BLD AUTO: 8.2 % (ref 5–12)
NEUTROPHILS NFR BLD AUTO: 3.1 10*3/MM3 (ref 1.7–7)
NEUTROPHILS NFR BLD AUTO: 59.5 % (ref 42.7–76)
NRBC BLD AUTO-RTO: 0.2 /100 WBC (ref 0–0.2)
PLATELET # BLD AUTO: 271 10*3/MM3 (ref 140–450)
PMV BLD AUTO: 6.7 FL (ref 6–12)
POTASSIUM SERPL-SCNC: 4.2 MMOL/L (ref 3.5–5.2)
RBC # BLD AUTO: 5.03 10*6/MM3 (ref 3.77–5.28)
SODIUM SERPL-SCNC: 140 MMOL/L (ref 136–145)
WBC # BLD AUTO: 5.3 10*3/MM3 (ref 3.4–10.8)

## 2021-03-01 PROCEDURE — 70491 CT SOFT TISSUE NECK W/DYE: CPT

## 2021-03-01 PROCEDURE — 85025 COMPLETE CBC W/AUTO DIFF WBC: CPT | Performed by: PHYSICIAN ASSISTANT

## 2021-03-01 PROCEDURE — 0 IOPAMIDOL PER 1 ML: Performed by: PHYSICIAN ASSISTANT

## 2021-03-01 PROCEDURE — 96375 TX/PRO/DX INJ NEW DRUG ADDON: CPT

## 2021-03-01 PROCEDURE — 25010000002 METHYLPREDNISOLONE PER 125 MG: Performed by: PHYSICIAN ASSISTANT

## 2021-03-01 PROCEDURE — 99283 EMERGENCY DEPT VISIT LOW MDM: CPT

## 2021-03-01 PROCEDURE — 25010000002 DIPHENHYDRAMINE PER 50 MG: Performed by: PHYSICIAN ASSISTANT

## 2021-03-01 PROCEDURE — 96374 THER/PROPH/DIAG INJ IV PUSH: CPT

## 2021-03-01 PROCEDURE — 99284 EMERGENCY DEPT VISIT MOD MDM: CPT

## 2021-03-01 PROCEDURE — 80048 BASIC METABOLIC PNL TOTAL CA: CPT | Performed by: PHYSICIAN ASSISTANT

## 2021-03-01 RX ORDER — DIPHENHYDRAMINE HCL 25 MG
25-50 CAPSULE ORAL EVERY 6 HOURS PRN
Qty: 30 CAPSULE | Refills: 0 | Status: SHIPPED | OUTPATIENT
Start: 2021-03-01 | End: 2022-02-19

## 2021-03-01 RX ORDER — DIPHENHYDRAMINE HYDROCHLORIDE 50 MG/ML
25 INJECTION INTRAMUSCULAR; INTRAVENOUS ONCE
Status: COMPLETED | OUTPATIENT
Start: 2021-03-01 | End: 2021-03-01

## 2021-03-01 RX ORDER — EPINEPHRINE 0.3 MG/.3ML
0.3 INJECTION SUBCUTANEOUS ONCE
Qty: 1 EACH | Refills: 0 | Status: SHIPPED | OUTPATIENT
Start: 2021-03-01 | End: 2021-03-01

## 2021-03-01 RX ORDER — METHYLPREDNISOLONE SODIUM SUCCINATE 125 MG/2ML
125 INJECTION, POWDER, LYOPHILIZED, FOR SOLUTION INTRAMUSCULAR; INTRAVENOUS ONCE
Status: COMPLETED | OUTPATIENT
Start: 2021-03-01 | End: 2021-03-01

## 2021-03-01 RX ORDER — SODIUM CHLORIDE 0.9 % (FLUSH) 0.9 %
10 SYRINGE (ML) INJECTION AS NEEDED
Status: DISCONTINUED | OUTPATIENT
Start: 2021-03-01 | End: 2021-03-01 | Stop reason: HOSPADM

## 2021-03-01 RX ADMIN — METHYLPREDNISOLONE SODIUM SUCCINATE 125 MG: 125 INJECTION, POWDER, FOR SOLUTION INTRAMUSCULAR; INTRAVENOUS at 08:56

## 2021-03-01 RX ADMIN — IOPAMIDOL 100 ML: 755 INJECTION, SOLUTION INTRAVENOUS at 09:26

## 2021-03-01 RX ADMIN — DIPHENHYDRAMINE HYDROCHLORIDE 25 MG: 50 INJECTION, SOLUTION INTRAMUSCULAR; INTRAVENOUS at 08:57

## 2021-03-01 RX ADMIN — SODIUM CHLORIDE 1000 ML: 9 INJECTION, SOLUTION INTRAVENOUS at 08:55

## 2021-03-04 ENCOUNTER — TELEPHONE (OUTPATIENT)
Dept: NEUROLOGY | Facility: CLINIC | Age: 56
End: 2021-03-04

## 2021-03-04 NOTE — TELEPHONE ENCOUNTER
Provider: DR. AGEE  Caller: MARIANA ROME  Relationship to Patient: SELF      Reason for Call:P CALLING STATING THAT SHE HAD ALLERGIC REACTION THIS PAST WEEKEND AND SHE IS GOING TO FIND OUT Monday FOR ALLERGY TESTING. PT BEEN ON EMGALITY FOR OVER A YEAR NOW. PT DID GO TO THE ED FOR IT. PT DOES TAKE THE EMGAILITY AND SHE WANTED TO LET DR. AGEE KNOW AND SHE WANTED TO SEE IF DR. AGEE HAD ANY INFORMATION FOR HER ABOUT IT OR IF HE WANTED TO SEE WHAT THE ALLERGIST FINDS OUT.      PLEASE CALL HER BACK [phone], IT IS OK IF YOU  NEED TO LEAVE A MESSAGE

## 2021-03-10 ENCOUNTER — HOSPITAL ENCOUNTER (EMERGENCY)
Facility: HOSPITAL | Age: 56
Discharge: HOME | End: 2021-03-10
Admitting: EMERGENCY MEDICINE

## 2021-03-10 VITALS
HEIGHT: 64 IN | DIASTOLIC BLOOD PRESSURE: 85 MMHG | BODY MASS INDEX: 34.29 KG/M2 | TEMPERATURE: 98.5 F | RESPIRATION RATE: 18 BRPM | OXYGEN SATURATION: 99 % | HEART RATE: 87 BPM | SYSTOLIC BLOOD PRESSURE: 127 MMHG | WEIGHT: 200.84 LBS

## 2021-03-10 DIAGNOSIS — T78.40XA ALLERGIC REACTION, INITIAL ENCOUNTER: Primary | ICD-10-CM

## 2021-03-10 LAB
ANION GAP SERPL CALCULATED.3IONS-SCNC: 11 MMOL/L (ref 5–15)
BASOPHILS # BLD AUTO: 0.1 10*3/MM3 (ref 0–0.2)
BASOPHILS NFR BLD AUTO: 0.9 % (ref 0–1.5)
BUN SERPL-MCNC: 23 MG/DL (ref 6–20)
BUN/CREAT SERPL: 20.7 (ref 7–25)
CALCIUM SPEC-SCNC: 8.7 MG/DL (ref 8.6–10.5)
CHLORIDE SERPL-SCNC: 104 MMOL/L (ref 98–107)
CO2 SERPL-SCNC: 23 MMOL/L (ref 22–29)
CREAT SERPL-MCNC: 1.11 MG/DL (ref 0.57–1)
DEPRECATED RDW RBC AUTO: 42.4 FL (ref 37–54)
EOSINOPHIL # BLD AUTO: 0.2 10*3/MM3 (ref 0–0.4)
EOSINOPHIL NFR BLD AUTO: 1.9 % (ref 0.3–6.2)
ERYTHROCYTE [DISTWIDTH] IN BLOOD BY AUTOMATED COUNT: 14.1 % (ref 12.3–15.4)
GFR SERPL CREATININE-BSD FRML MDRD: 51 ML/MIN/1.73
GLUCOSE SERPL-MCNC: 96 MG/DL (ref 65–99)
HCT VFR BLD AUTO: 46.1 % (ref 34–46.6)
HGB BLD-MCNC: 15.6 G/DL (ref 12–15.9)
LYMPHOCYTES # BLD AUTO: 2.1 10*3/MM3 (ref 0.7–3.1)
LYMPHOCYTES NFR BLD AUTO: 21.6 % (ref 19.6–45.3)
MCH RBC QN AUTO: 29.2 PG (ref 26.6–33)
MCHC RBC AUTO-ENTMCNC: 33.8 G/DL (ref 31.5–35.7)
MCV RBC AUTO: 86.4 FL (ref 79–97)
MONOCYTES # BLD AUTO: 0.5 10*3/MM3 (ref 0.1–0.9)
MONOCYTES NFR BLD AUTO: 5.3 % (ref 5–12)
NEUTROPHILS NFR BLD AUTO: 6.9 10*3/MM3 (ref 1.7–7)
NEUTROPHILS NFR BLD AUTO: 70.3 % (ref 42.7–76)
NRBC BLD AUTO-RTO: 0 /100 WBC (ref 0–0.2)
PLATELET # BLD AUTO: 282 10*3/MM3 (ref 140–450)
PMV BLD AUTO: 7.1 FL (ref 6–12)
POTASSIUM SERPL-SCNC: 3.8 MMOL/L (ref 3.5–5.2)
RBC # BLD AUTO: 5.33 10*6/MM3 (ref 3.77–5.28)
SODIUM SERPL-SCNC: 138 MMOL/L (ref 136–145)
WBC # BLD AUTO: 9.8 10*3/MM3 (ref 3.4–10.8)
WHOLE BLOOD HOLD SPECIMEN: NORMAL

## 2021-03-10 PROCEDURE — 85025 COMPLETE CBC W/AUTO DIFF WBC: CPT | Performed by: NURSE PRACTITIONER

## 2021-03-10 PROCEDURE — 99283 EMERGENCY DEPT VISIT LOW MDM: CPT

## 2021-03-10 PROCEDURE — 80048 BASIC METABOLIC PNL TOTAL CA: CPT | Performed by: NURSE PRACTITIONER

## 2021-03-10 PROCEDURE — 25010000002 METHYLPREDNISOLONE PER 125 MG: Performed by: NURSE PRACTITIONER

## 2021-03-10 PROCEDURE — 63710000001 DIPHENHYDRAMINE PER 50 MG: Performed by: NURSE PRACTITIONER

## 2021-03-10 PROCEDURE — 96372 THER/PROPH/DIAG INJ SC/IM: CPT

## 2021-03-10 RX ORDER — NITROGLYCERIN 0.4 MG/1
0.4 TABLET SUBLINGUAL ONCE
Status: DISCONTINUED | OUTPATIENT
Start: 2021-03-10 | End: 2021-03-10

## 2021-03-10 RX ORDER — METHYLPREDNISOLONE SODIUM SUCCINATE 125 MG/2ML
125 INJECTION, POWDER, LYOPHILIZED, FOR SOLUTION INTRAMUSCULAR; INTRAVENOUS ONCE
Status: DISCONTINUED | OUTPATIENT
Start: 2021-03-10 | End: 2021-03-10

## 2021-03-10 RX ORDER — FAMOTIDINE 20 MG/1
20 TABLET, FILM COATED ORAL ONCE
Status: COMPLETED | OUTPATIENT
Start: 2021-03-10 | End: 2021-03-10

## 2021-03-10 RX ORDER — DIPHENHYDRAMINE HCL 25 MG
50 CAPSULE ORAL ONCE
Status: COMPLETED | OUTPATIENT
Start: 2021-03-10 | End: 2021-03-10

## 2021-03-10 RX ORDER — DIPHENHYDRAMINE HYDROCHLORIDE 50 MG/ML
25 INJECTION INTRAMUSCULAR; INTRAVENOUS ONCE
Status: DISCONTINUED | OUTPATIENT
Start: 2021-03-10 | End: 2021-03-10

## 2021-03-10 RX ORDER — FAMOTIDINE 20 MG/1
20 TABLET, FILM COATED ORAL
Status: DISCONTINUED | OUTPATIENT
Start: 2021-03-11 | End: 2021-03-10 | Stop reason: SDUPTHER

## 2021-03-10 RX ORDER — SODIUM CHLORIDE 0.9 % (FLUSH) 0.9 %
10 SYRINGE (ML) INJECTION AS NEEDED
Status: DISCONTINUED | OUTPATIENT
Start: 2021-03-10 | End: 2021-03-11 | Stop reason: HOSPADM

## 2021-03-10 RX ORDER — METHYLPREDNISOLONE SODIUM SUCCINATE 125 MG/2ML
125 INJECTION, POWDER, LYOPHILIZED, FOR SOLUTION INTRAMUSCULAR; INTRAVENOUS ONCE
Status: COMPLETED | OUTPATIENT
Start: 2021-03-10 | End: 2021-03-10

## 2021-03-10 RX ADMIN — METHYLPREDNISOLONE SODIUM SUCCINATE 125 MG: 125 INJECTION, POWDER, FOR SOLUTION INTRAMUSCULAR; INTRAVENOUS at 22:06

## 2021-03-10 RX ADMIN — FAMOTIDINE 20 MG: 20 TABLET ORAL at 22:06

## 2021-03-10 RX ADMIN — DIPHENHYDRAMINE HYDROCHLORIDE 50 MG: 25 CAPSULE ORAL at 22:06

## 2021-03-11 NOTE — ED NOTES
Multiple attempts made to obtain IV access, RN was unsuccessful. NP at bedside notified.      Ramón Delvalle RN  03/10/21 3277

## 2021-03-11 NOTE — DISCHARGE INSTRUCTIONS
Use 50 mg of Benadryl at home and 20 mg of Pepcid if symptoms return.    Use EpiPen you have at home for any difficulty breathing or swallowing.    Follow-up with your primary care for any further problems    Try to avoid any known triggers    Return if worse

## 2021-03-11 NOTE — ED PROVIDER NOTES
Subjective   Patient is a 55-year-old female who states this is her second visit to the emergency room in the last 7 days for allergic reaction.  She states that she thinks it is to chewing gum.  She states that she had allergy testing done earlier this week and it did not show any thing that might be causing her reaction she states they only checked for natural causing things and foods and did not check for any chemicals.  She has a history of Bell's palsy from 20 years ago and has some residual paralysis of the left side of her face.    She denies any pain at this time she denies any difficulty breathing or swallowing.  She states she feels like her tongue is swollen and her right lower lip is swollen.          Review of Systems   Constitutional: Negative for chills, fatigue and fever.   HENT: Negative for congestion, tinnitus and trouble swallowing.    Eyes: Negative for photophobia, discharge and redness.   Respiratory: Negative for cough and shortness of breath.    Cardiovascular: Negative for chest pain and palpitations.   Gastrointestinal: Negative for abdominal pain, diarrhea, nausea and vomiting.   Genitourinary: Negative for dysuria, frequency and urgency.   Musculoskeletal: Negative for back pain, joint swelling and myalgias.   Skin: Negative for rash.   Neurological: Negative for dizziness and headaches.   Psychiatric/Behavioral: Negative for confusion.   All other systems reviewed and are negative.      Past Medical History:   Diagnosis Date   • Allergic    • Asthma 11/21/2019   • Bell palsy     Rt Side   • Heart murmur    • Kidney stone    • MAMMO     NEG = 2018   • Migraine     MIGRAINES   • Obesity 11/21/2019   • PAP     NEG = 2018   • Raynaud phenomenon        Allergies   Allergen Reactions   • Prednisone Mental Status Change     Psychosis   • Latex Rash   • Oxycodone-Acetaminophen Rash     CAN TAKE WITH BENADRYL       Past Surgical History:   Procedure Laterality Date   • APPENDECTOMY  07/2000   •  APPENDECTOMY      20 years ago    • COLONOSCOPY      Cologuard ----2019   • CYSTOSCOPY BLADDER STONE LITHOTRIPSY  09/2019    x 2       Family History   Problem Relation Age of Onset   • Arthritis Mother    • Osteoporosis Mother    • Stroke Mother    • Hypertension Mother    • Arthritis Father    • Mental illness Father         PTSD   • Hyperlipidemia Father    • Kidney disease Father         RTA   • Cancer Father         Tongue Cancer   • Obesity Father    • Hypertension Father    • Kidney nephrosis Father    • Hypertension Brother    • Kidney nephrosis Brother    • Migraines Daughter    • Hyperlipidemia Son    • Kidney disease Son    • Migraines Maternal Aunt    • Hyperlipidemia Maternal Uncle    • Heart attack Maternal Uncle    • Arthritis Maternal Grandmother    • Cancer Maternal Grandmother    • Obesity Maternal Grandmother    • Breast cancer Maternal Grandmother    • Cancer Maternal Grandfather    • Stroke Paternal Grandfather        Social History     Socioeconomic History   • Marital status:      Spouse name: Not on file   • Number of children: Not on file   • Years of education: Not on file   • Highest education level: Not on file   Tobacco Use   • Smoking status: Never Smoker   • Smokeless tobacco: Never Used   Substance and Sexual Activity   • Alcohol use: No   • Drug use: No   • Sexual activity: Defer           Objective   Physical Exam  Vitals reviewed.   Constitutional:       Appearance: Normal appearance. She is well-developed.   HENT:      Head: Normocephalic and atraumatic.      Mouth/Throat:      Lips: Pink.      Mouth: Mucous membranes are moist. No angioedema.      Tongue: No lesions. Tongue does not deviate from midline.      Palate: No mass and lesions.      Pharynx: Oropharynx is clear. Uvula midline. No uvula swelling.      Tonsils: No tonsillar exudate or tonsillar abscesses.     Eyes:      Conjunctiva/sclera: Conjunctivae normal.      Pupils: Pupils are equal, round, and reactive to  "light.   Cardiovascular:      Rate and Rhythm: Normal rate and regular rhythm.      Heart sounds: Normal heart sounds.   Pulmonary:      Effort: Pulmonary effort is normal. No respiratory distress.      Breath sounds: Normal breath sounds. No wheezing.   Abdominal:      General: Bowel sounds are normal. There is no distension.      Palpations: Abdomen is soft. There is no mass.      Tenderness: There is no abdominal tenderness. There is no guarding or rebound.   Musculoskeletal:         General: No deformity. Normal range of motion.      Cervical back: Full passive range of motion without pain, normal range of motion and neck supple.   Skin:     General: Skin is warm and dry.      Capillary Refill: Capillary refill takes less than 2 seconds.   Neurological:      General: No focal deficit present.      Mental Status: She is alert and oriented to person, place, and time.      GCS: GCS eye subscore is 4. GCS verbal subscore is 5. GCS motor subscore is 6.      Cranial Nerves: No cranial nerve deficit.      Sensory: No sensory deficit.      Deep Tendon Reflexes: Reflexes normal.      Comments: Patient has some baseline permanent paralysis on the left side from Bell's palsy some drooping of the left nasolabial fold-   Psychiatric:         Mood and Affect: Mood normal.         Behavior: Behavior normal.         Thought Content: Thought content normal.         Judgment: Judgment normal.         Procedures           ED Course      /97 (Patient Position: Sitting)   Pulse 100   Temp 98.7 °F (37.1 °C) (Oral)   Resp 17   Ht 162.6 cm (64\")   Wt 91.1 kg (200 lb 13.4 oz)   SpO2 96%   BMI 34.47 kg/m²   Labs Reviewed   BASIC METABOLIC PANEL - Abnormal; Notable for the following components:       Result Value    BUN 23 (*)     Creatinine 1.11 (*)     eGFR Non  Amer 51 (*)     All other components within normal limits    Narrative:     GFR Normal >60  Chronic Kidney Disease <60  Kidney Failure <15     CBC WITH AUTO " DIFFERENTIAL - Abnormal; Notable for the following components:    RBC 5.33 (*)     All other components within normal limits   RAINBOW DRAW    Narrative:     The following orders were created for panel order [facility] [place] Draw.  Procedure                               Abnormality         Status                     ---------                               -----------         ------                     Light Blue Top[[id number]]                                                              Green Top (Gel)[[id number]]                                  Final result               Lavender Top[[id number]]                                     Final result               Gold Top - SST[[id number]]                                                                Please view results for these tests on the individual orders.   CBC AND DIFFERENTIAL    Narrative:     The following orders were created for panel order CBC & Differential.  Procedure                               Abnormality         Status                     ---------                               -----------         ------                     CBC Auto Differential[[id number]]        Abnormal            Final result                 Please view results for these tests on the individual orders.   GREEN TOP   LAVENDER TOP   LIGHT BLUE TOP   GOLD TOP - SST     Medications   sodium chloride 0.9 % flush 10 mL (has no administration in time range)   methylPREDNISolone sodium succinate (SOLU-Medrol) injection 125 mg (125 mg Intramuscular Given 3/10/21 2206)   diphenhydrAMINE (BENADRYL) capsule 50 mg (50 mg Oral Given 3/10/21 2206)   famotidine (PEPCID) tablet 20 mg (20 mg Oral Given 3/10/21 2206)     No radiology results for the last day                                       MDM  Number of Diagnoses or Management Options  Diagnosis management comments: Patient had IV ordered and blood work was obtained-IV stick proved to be difficult and patient was given IM and p.o. medication.    On  reevaluation the patient states she feels better.  She still is controlling her secretions she is having no difficulty breathing or swallowing.  She will be discharged home she states she has an EpiPen already at home she was advised how to use Benadryl and Pepcid for further reactions she was advised to try to avoid trigger and to return if worse she verbalized understood discharge instructions       Amount and/or Complexity of Data Reviewed  Clinical lab tests: reviewed    Risk of Complications, Morbidity, and/or Mortality  Presenting problems: minimal  Diagnostic procedures: minimal  Management options: minimal    Patient Progress  Patient progress: improved      Final diagnoses:   Allergic reaction, initial encounter            Cathy Grover, APRN  03/10/21 8854

## 2021-03-16 ENCOUNTER — OFFICE VISIT (OUTPATIENT)
Dept: NEUROLOGY | Facility: CLINIC | Age: 56
End: 2021-03-16

## 2021-03-16 VITALS
BODY MASS INDEX: 34.15 KG/M2 | DIASTOLIC BLOOD PRESSURE: 86 MMHG | SYSTOLIC BLOOD PRESSURE: 134 MMHG | HEIGHT: 64 IN | HEART RATE: 82 BPM | WEIGHT: 200 LBS | OXYGEN SATURATION: 98 %

## 2021-03-16 DIAGNOSIS — G43.109 MIGRAINE WITH AURA, NOT INTRACTABLE, WITHOUT STATUS MIGRAINOSUS: Primary | ICD-10-CM

## 2021-03-16 PROCEDURE — 99213 OFFICE O/P EST LOW 20 MIN: CPT | Performed by: PSYCHIATRY & NEUROLOGY

## 2021-03-16 RX ORDER — FAMOTIDINE 20 MG/1
20 TABLET, FILM COATED ORAL 2 TIMES DAILY PRN
COMMUNITY
End: 2022-02-19

## 2021-03-16 NOTE — PROGRESS NOTES
Chief Complaint   Patient presents with   • Migraine       Patient ID: Stormy Morrow is a 55 y.o. female.    HPI: I had the pleasure of seeing your patient today.  As you may know she is a 55-year-old female with a history of migraine headaches.  She has been doing well from a migraine standpoint since her last visit.  She says that she has had about 2 to 3 days of headaches within the last 30 days.  They were migrainous.  She typically will take naproxen.  She is receiving Emgality injections q. monthly.  She appears to have tolerated it well thus far.  She did have 2 back-to-back episodes of anaphylaxis however earlier this month.  This occurred quite some time after the initiation of the Emgality injections.  She denies any focal weakness or numbness.  No double vision or loss of vision.  She does have some fatigue which has been a chronic issue.    The following portions of the patient's history were reviewed and updated as appropriate: allergies, current medications, past family history, past medical history, past social history, past surgical history and problem list.    Review of Systems   Constitutional: Positive for fatigue (pt stated this is daily ). Negative for activity change and appetite change.   HENT: Negative for facial swelling, hearing loss and trouble swallowing.    Musculoskeletal: Negative for back pain, gait problem and neck pain.   Allergic/Immunologic: Negative for environmental allergies, food allergies and immunocompromised state.   Neurological: Positive for weakness, numbness and headaches. Negative for dizziness, tremors, seizures, syncope, facial asymmetry, speech difficulty and light-headedness.   Hematological: Negative for adenopathy. Does not bruise/bleed easily.   Psychiatric/Behavioral: Negative for agitation, behavioral problems, confusion, decreased concentration, dysphoric mood, hallucinations, self-injury, sleep disturbance and suicidal ideas. The patient is not  nervous/anxious and is not hyperactive.       I have reviewed the review of systems above performed by my medical assistant.      Vitals:    21 1325   BP: 134/86   Pulse: 82   SpO2: 98%       Neurologic Exam     Mental Status   Oriented to person, place, and time.   Concentration: normal.   Level of consciousness: alert  Knowledge: consistent with education (No deficits found.).     Cranial Nerves     CN II   Visual fields full to confrontation.     CN III, IV, VI   Pupils are equal, round, and reactive to light.  Extraocular motions are normal.   CN III: no CN III palsy  CN VI: no CN VI palsy    CN V   Facial sensation intact.     CN VII   Facial expression full, symmetric.     CN VIII   CN VIII normal.     CN IX, X   CN IX normal.   CN X normal.     CN XI   CN XI normal.     CN XII   CN XII normal.     Motor Exam     Strength   Right neck flexion: 5/5  Left neck flexion: 5/5  Right neck extension: 5/5  Left neck extension: 5/5  Right deltoid: 5/5  Left deltoid: 5/5  Right biceps: 5/5  Left biceps: 5/5  Right triceps: 5/5  Left triceps: 5/5  Right wrist flexion: 5/5  Left wrist flexion: 5/5  Right wrist extension: 5/5  Left wrist extension: 5/5  Right interossei: 5/5  Left interossei: 5/5  Right abdominals: 5/5  Left abdominals: 5/5  Right iliopsoas: 5/5  Left iliopsoas: 5/5  Right quadriceps: 5/5  Left quadriceps: 5/5  Right hamstrin/5  Left hamstrin/5  Right glutei: 5/5  Left glutei: 5/5  Right anterior tibial: 5/5  Left anterior tibial: 5/5  Right posterior tibial: 5/5  Left posterior tibial: 5/5  Right peroneal: 5/5  Left peroneal: 5/5  Right gastroc: 5/5  Left gastroc: 5/5    Sensory Exam   Light touch normal.   Vibration normal.     Gait, Coordination, and Reflexes     Gait  Gait: normal    Reflexes   Right brachioradialis: 2+  Left brachioradialis: 2+  Right biceps: 2+  Left biceps: 2+  Right triceps: 2+  Left triceps: 2+  Right patellar: 2+  Left patellar: 2+  Right achilles: 2+  Left  achilles: 2+  Right : 2+  Left : 2+Station is normal.       Physical Exam  Vitals reviewed.   Constitutional:       Appearance: She is well-developed.   HENT:      Head: Normocephalic and atraumatic.   Eyes:      Extraocular Movements: EOM normal.      Pupils: Pupils are equal, round, and reactive to light.   Cardiovascular:      Rate and Rhythm: Normal rate and regular rhythm.   Pulmonary:      Breath sounds: Normal breath sounds.   Musculoskeletal:         General: Normal range of motion.   Skin:     General: Skin is warm.   Neurological:      Mental Status: She is oriented to person, place, and time.      Gait: Gait is intact.      Deep Tendon Reflexes:      Reflex Scores:       Tricep reflexes are 2+ on the right side and 2+ on the left side.       Bicep reflexes are 2+ on the right side and 2+ on the left side.       Brachioradialis reflexes are 2+ on the right side and 2+ on the left side.       Patellar reflexes are 2+ on the right side and 2+ on the left side.       Achilles reflexes are 2+ on the right side and 2+ on the left side.        Procedures    Assessment/Plan: We will continue her current regimen of the Emgality injections monthly along with magnesium daily and naproxen as needed.  Return to clinic in 6 months or sooner if needed.  A total of 25 minutes was spent face-to-face with the patient today.  Of that greater than 50% of this time was spent discussing signs and symptoms of migraine headache, patient education, plan of care and prognosis.       Diagnoses and all orders for this visit:    1. Migraine with aura, not intractable, without status migrainosus (Primary)           Chuy Dasilva II, MD

## 2021-04-18 DIAGNOSIS — G43.109 MIGRAINE WITH AURA, NOT INTRACTABLE, WITHOUT STATUS MIGRAINOSUS: ICD-10-CM

## 2021-04-19 RX ORDER — GALCANEZUMAB 120 MG/ML
INJECTION, SOLUTION SUBCUTANEOUS
Qty: 1 PEN | Refills: 5 | Status: SHIPPED | OUTPATIENT
Start: 2021-04-19 | End: 2021-09-28 | Stop reason: SDUPTHER

## 2021-04-19 RX ORDER — MONTELUKAST SODIUM 10 MG/1
TABLET ORAL
Qty: 90 TABLET | Refills: 0 | OUTPATIENT
Start: 2021-04-19

## 2021-05-05 RX ORDER — MONTELUKAST SODIUM 10 MG/1
TABLET ORAL
Qty: 90 TABLET | Refills: 0 | OUTPATIENT
Start: 2021-05-05

## 2021-05-06 ENCOUNTER — OFFICE VISIT (OUTPATIENT)
Dept: FAMILY MEDICINE CLINIC | Facility: CLINIC | Age: 56
End: 2021-05-06

## 2021-05-06 VITALS
TEMPERATURE: 96.9 F | SYSTOLIC BLOOD PRESSURE: 161 MMHG | OXYGEN SATURATION: 100 % | RESPIRATION RATE: 16 BRPM | BODY MASS INDEX: 35.17 KG/M2 | DIASTOLIC BLOOD PRESSURE: 109 MMHG | HEIGHT: 64 IN | HEART RATE: 93 BPM | WEIGHT: 206 LBS

## 2021-05-06 DIAGNOSIS — Z12.31 SCREENING MAMMOGRAM, ENCOUNTER FOR: ICD-10-CM

## 2021-05-06 DIAGNOSIS — Z88.9 H/O MULTIPLE ALLERGIES: ICD-10-CM

## 2021-05-06 DIAGNOSIS — J45.20 MILD INTERMITTENT ASTHMA WITHOUT COMPLICATION: Primary | ICD-10-CM

## 2021-05-06 DIAGNOSIS — R03.0 ELEVATED BLOOD PRESSURE READING IN OFFICE WITHOUT DIAGNOSIS OF HYPERTENSION: ICD-10-CM

## 2021-05-06 PROBLEM — R78.81 BACTEREMIA DUE TO PSEUDOMONAS: Status: ACTIVE | Noted: 2019-09-23

## 2021-05-06 PROBLEM — B96.5 BACTEREMIA DUE TO PSEUDOMONAS: Status: ACTIVE | Noted: 2019-09-23

## 2021-05-06 PROBLEM — N20.0 KIDNEY STONE: Status: ACTIVE | Noted: 2020-08-31

## 2021-05-06 PROBLEM — A41.9 SEVERE SEPSIS: Status: ACTIVE | Noted: 2020-09-12

## 2021-05-06 PROBLEM — N13.5 URETERAL OBSTRUCTION: Status: ACTIVE | Noted: 2020-09-12

## 2021-05-06 PROBLEM — R65.20 SEVERE SEPSIS: Status: ACTIVE | Noted: 2020-09-12

## 2021-05-06 PROBLEM — N13.30 HYDRONEPHROSIS OF RIGHT KIDNEY: Status: ACTIVE | Noted: 2019-09-21

## 2021-05-06 PROBLEM — N10 ACUTE PYELONEPHRITIS: Status: ACTIVE | Noted: 2020-09-12

## 2021-05-06 PROBLEM — R10.9 FLANK PAIN: Status: ACTIVE | Noted: 2020-08-31

## 2021-05-06 PROBLEM — E87.20 LACTIC ACIDOSIS: Status: ACTIVE | Noted: 2020-09-12

## 2021-05-06 PROCEDURE — 99213 OFFICE O/P EST LOW 20 MIN: CPT | Performed by: FAMILY MEDICINE

## 2021-05-06 RX ORDER — MONTELUKAST SODIUM 10 MG/1
10 TABLET ORAL DAILY
Qty: 90 TABLET | Refills: 0 | Status: SHIPPED | OUTPATIENT
Start: 2021-05-06 | End: 2021-07-28

## 2021-05-06 RX ORDER — MONTELUKAST SODIUM 10 MG/1
10 TABLET ORAL DAILY
Qty: 90 TABLET | Refills: 2 | Status: SHIPPED | OUTPATIENT
Start: 2021-05-06 | End: 2021-05-06 | Stop reason: SDUPTHER

## 2021-05-06 RX ORDER — MONTELUKAST SODIUM 10 MG/1
10 TABLET ORAL DAILY
Qty: 90 TABLET | Refills: 0 | Status: SHIPPED | OUTPATIENT
Start: 2021-05-06 | End: 2021-05-06 | Stop reason: SDUPTHER

## 2021-05-06 NOTE — PROGRESS NOTES
Subjective   Stormy Morrow is a 55 y.o. female.     Chief Complaint   Patient presents with   • Med Refill         Current Outpatient Medications:   •  albuterol sulfate  (90 Base) MCG/ACT inhaler, Inhale 2 puffs Every 6 (Six) Hours As Needed for Wheezing or Shortness of Air., Disp: 18 g, Rfl: 0  •  Cetirizine HCl 10 MG capsule, Take 1 capsule by mouth Daily., Disp: 90 capsule, Rfl: 3  •  diphenhydrAMINE (BENADRYL) 25 mg capsule, Take 1-2 capsules by mouth Every 6 (Six) Hours As Needed for Itching., Disp: 30 capsule, Rfl: 0  •  Emgality 120 MG/ML auto-injector pen, INJECT 1 ML UNDER THE SKIN AS DIRECTED FOR 1 DOSE, Disp: 1 pen, Rfl: 5  •  EPINEPHrine (EPIPEN) 0.3 MG/0.3ML solution auto-injector injection, INJECT 0.3 ML UNDER THE SKIN INTO THE APPROPRIATE AREA AS DIRECTED 1 (ONE) TIME FOR 1 DOSE., Disp: , Rfl:   •  famotidine (PEPCID) 20 MG tablet, Take 20 mg by mouth 2 (Two) Times a Day As Needed for Heartburn., Disp: , Rfl:   •  Flonase Sensimist 27.5 MCG/SPRAY nasal spray, INHALE 2 PUFFS NASALLY AS DIRECTED, Disp: 10 g, Rfl: 0  •  Magnesium 100 MG capsule, Take  by mouth., Disp: , Rfl:   •  montelukast (SINGULAIR) 10 MG tablet, Take 1 tablet by mouth Daily., Disp: 90 tablet, Rfl: 0  •  Nutritional Supplements (THERALITH XR) tablet, Take 2 tablets by mouth 2 (Two) Times a Day., Disp: , Rfl:     Past Medical History:   Diagnosis Date   • Allergic    • Asthma 11/21/2019   • Bell palsy     Rt Side   • Heart murmur    • Kidney stone    • MAMMO     NEG = 2018/ 2019   • Migraine     MIGRAINES   • Obesity 11/21/2019   • PAP     NEG = 2018/ 2019   • Raynaud phenomenon        Past Surgical History:   Procedure Laterality Date   • APPENDECTOMY  07/2000   • APPENDECTOMY      20 years ago    • COLONOSCOPY      Cologuard ----2019= NEG, rech 2022   • CYSTOSCOPY BLADDER STONE LITHOTRIPSY  2019/ 2020    x 2       Family History   Problem Relation Age of Onset   • Arthritis Mother    • Osteoporosis Mother    • Stroke  Mother    • Hypertension Mother    • Arthritis Father    • Mental illness Father         PTSD   • Hyperlipidemia Father    • Kidney disease Father         RTA   • Cancer Father         Tongue Cancer   • Obesity Father    • Hypertension Father    • Kidney nephrosis Father    • Hypertension Brother    • Kidney nephrosis Brother    • Migraines Daughter    • Hyperlipidemia Son    • Kidney disease Son    • Migraines Maternal Aunt    • Hyperlipidemia Maternal Uncle    • Heart attack Maternal Uncle    • Arthritis Maternal Grandmother    • Cancer Maternal Grandmother    • Obesity Maternal Grandmother    • Breast cancer Maternal Grandmother    • Cancer Maternal Grandfather    • Stroke Paternal Grandfather        Social History     Socioeconomic History   • Marital status:      Spouse name: Not on file   • Number of children: Not on file   • Years of education: Not on file   • Highest education level: Not on file   Tobacco Use   • Smoking status: Never Smoker   • Smokeless tobacco: Never Used   Vaping Use   • Vaping Use: Never used   Substance and Sexual Activity   • Alcohol use: No   • Drug use: No   • Sexual activity: Defer       54 y/o C female here for annual visit for Allergies/ Asthma    Pt unhappy that she had to come in despite last office visit 11/ 2019; Pt has had mult issues this yr w/ kidney stones/ sepsis as well as anaphylaxis of ?etiology-----she sees urology and allergist regularly and a neurologist for her HA's    Pt states w/o her singulair, she is at risk for anaphylaxis and the pharmacy never told her she needed to come in to get her Rx; Pt feels her BP is up because she is so mad       The following portions of the patient's history were reviewed and updated as appropriate: allergies, current medications, past family history, past medical history, past social history, past surgical history and problem list.    Review of Systems   Constitutional: Negative for activity change, appetite change,  unexpected weight gain and unexpected weight loss.   HENT: Negative for congestion, postnasal drip, rhinorrhea, sneezing and swollen glands.    Respiratory: Negative for cough, chest tightness, shortness of breath and wheezing.    Neurological: Negative for headache.       Vitals:    05/06/21 0831   BP: (!) 161/109   Pulse: 93   Resp: 16   Temp: 96.9 °F (36.1 °C)   SpO2: 100%       Objective   Physical Exam  Vitals reviewed.   Constitutional:       Appearance: Normal appearance. She is not ill-appearing or toxic-appearing.   Neck:      Vascular: No carotid bruit.   Cardiovascular:      Rate and Rhythm: Normal rate and regular rhythm.      Pulses: Normal pulses.   Pulmonary:      Effort: Pulmonary effort is normal. No respiratory distress.      Breath sounds: No stridor. No wheezing, rhonchi or rales.   Musculoskeletal:      Cervical back: Normal range of motion and neck supple.   Neurological:      Mental Status: She is alert and oriented to person, place, and time.      Cranial Nerves: No cranial nerve deficit.   Psychiatric:         Attention and Perception: Attention normal.         Mood and Affect: Affect is angry.         Speech: Speech normal.         Behavior: Behavior is agitated. Behavior is cooperative.         Thought Content: Thought content normal.         Cognition and Memory: Cognition and memory normal.         Judgment: Judgment normal.           Assessment/Plan   Diagnoses and all orders for this visit:    1. Mild intermittent asthma without complication (Primary)    2. H/O multiple allergies    3. Elevated blood pressure reading in office without diagnosis of hypertension    4. Screening mammogram, encounter for  -     Mammo Screening Digital Tomosynthesis Bilateral With CAD; Future    Other orders  -     Discontinue: montelukast (SINGULAIR) 10 MG tablet; Take 1 tablet by mouth Daily.  Dispense: 90 tablet; Refill: 2  -     Discontinue: montelukast (SINGULAIR) 10 MG tablet; Take 1 tablet by mouth  Daily.  Dispense: 90 tablet; Refill: 0  -     Cetirizine HCl 10 MG capsule; Take 1 capsule by mouth Daily.  Dispense: 90 capsule; Refill: 3  -     montelukast (SINGULAIR) 10 MG tablet; Take 1 tablet by mouth Daily.  Dispense: 90 tablet; Refill: 0    Pt to monitor o/s BP readings and call if >140/90  Meds refilled

## 2021-05-11 ENCOUNTER — TRANSCRIBE ORDERS (OUTPATIENT)
Dept: ADMINISTRATIVE | Facility: HOSPITAL | Age: 56
End: 2021-05-11

## 2021-05-11 DIAGNOSIS — Z12.31 SCREENING MAMMOGRAM FOR HIGH-RISK PATIENT: Primary | ICD-10-CM

## 2021-05-25 ENCOUNTER — HOSPITAL ENCOUNTER (OUTPATIENT)
Dept: MAMMOGRAPHY | Facility: HOSPITAL | Age: 56
Discharge: HOME | End: 2021-05-25
Admitting: FAMILY MEDICINE

## 2021-05-25 DIAGNOSIS — Z12.31 SCREENING MAMMOGRAM, ENCOUNTER FOR: ICD-10-CM

## 2021-05-25 PROCEDURE — 77063 BREAST TOMOSYNTHESIS BI: CPT

## 2021-05-25 PROCEDURE — 77067 SCR MAMMO BI INCL CAD: CPT

## 2021-05-26 ENCOUNTER — TELEPHONE (OUTPATIENT)
Dept: FAMILY MEDICINE CLINIC | Facility: CLINIC | Age: 56
End: 2021-05-26

## 2021-07-28 ENCOUNTER — LAB REQUISITION (OUTPATIENT)
Dept: LAB | Facility: HOSPITAL | Age: 56
End: 2021-07-28

## 2021-07-28 DIAGNOSIS — N20.0 CALCULUS OF KIDNEY: ICD-10-CM

## 2021-07-28 LAB
ANION GAP SERPL CALCULATED.3IONS-SCNC: 9 MMOL/L (ref 5–15)
BUN SERPL-MCNC: 16 MG/DL (ref 6–20)
BUN/CREAT SERPL: 16.5 (ref 7–25)
CALCIUM SPEC-SCNC: 8.9 MG/DL (ref 8.6–10.5)
CHLORIDE SERPL-SCNC: 101 MMOL/L (ref 98–107)
CO2 SERPL-SCNC: 28 MMOL/L (ref 22–29)
CREAT SERPL-MCNC: 0.97 MG/DL (ref 0.57–1)
GFR SERPL CREATININE-BSD FRML MDRD: 60 ML/MIN/1.73
GLUCOSE SERPL-MCNC: 93 MG/DL (ref 65–99)
POTASSIUM SERPL-SCNC: 3.5 MMOL/L (ref 3.5–5.2)
SODIUM SERPL-SCNC: 138 MMOL/L (ref 136–145)

## 2021-07-28 PROCEDURE — 80048 BASIC METABOLIC PNL TOTAL CA: CPT | Performed by: UROLOGY

## 2021-07-28 RX ORDER — MONTELUKAST SODIUM 10 MG/1
TABLET ORAL
Qty: 90 TABLET | Refills: 0 | Status: SHIPPED | OUTPATIENT
Start: 2021-07-28 | End: 2022-01-03

## 2021-07-28 NOTE — TELEPHONE ENCOUNTER
Last visit:  5/6/21  Next visit: none  Last labs: 3/10/21    Rx requested:Singulair   Pharmacy: [facility] [place]

## 2021-09-28 ENCOUNTER — OFFICE VISIT (OUTPATIENT)
Dept: NEUROLOGY | Facility: CLINIC | Age: 56
End: 2021-09-28

## 2021-09-28 VITALS
BODY MASS INDEX: 35.51 KG/M2 | DIASTOLIC BLOOD PRESSURE: 76 MMHG | SYSTOLIC BLOOD PRESSURE: 126 MMHG | RESPIRATION RATE: 16 BRPM | HEIGHT: 64 IN | OXYGEN SATURATION: 97 % | WEIGHT: 208 LBS | HEART RATE: 78 BPM

## 2021-09-28 DIAGNOSIS — G43.109 MIGRAINE WITH AURA, NOT INTRACTABLE, WITHOUT STATUS MIGRAINOSUS: ICD-10-CM

## 2021-09-28 DIAGNOSIS — Z86.69 HISTORY OF BELL'S PALSY: ICD-10-CM

## 2021-09-28 DIAGNOSIS — Z87.442 HISTORY OF KIDNEY STONES: ICD-10-CM

## 2021-09-28 PROCEDURE — 99215 OFFICE O/P EST HI 40 MIN: CPT | Performed by: NURSE PRACTITIONER

## 2021-09-28 RX ORDER — POTASSIUM CHLORIDE 750 MG/1
10 CAPSULE, EXTENDED RELEASE ORAL DAILY
COMMUNITY
Start: 2021-08-02 | End: 2022-05-17 | Stop reason: SDUPTHER

## 2021-09-28 RX ORDER — CHLORTHALIDONE 25 MG/1
12.5 TABLET ORAL DAILY
COMMUNITY
Start: 2021-08-18

## 2021-09-28 RX ORDER — GALCANEZUMAB 120 MG/ML
120 INJECTION, SOLUTION SUBCUTANEOUS
Qty: 1 PEN | Refills: 11 | Status: SHIPPED | OUTPATIENT
Start: 2021-09-28 | End: 2022-01-27 | Stop reason: SDUPTHER
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
## 2021-09-29 ENCOUNTER — TELEPHONE (OUTPATIENT)
Dept: NEUROLOGY | Facility: CLINIC | Age: 56
End: 2021-09-29

## 2021-09-29 NOTE — TELEPHONE ENCOUNTER
Called and left pt VM stating that the emgality would be available for her to  at the [facility] when he's available.

## 2021-11-03 ENCOUNTER — TELEPHONE (OUTPATIENT)
Dept: NEUROLOGY | Facility: CLINIC | Age: 56
End: 2021-11-03

## 2021-11-03 NOTE — TELEPHONE ENCOUNTER
Provider: ESPERANZA  Caller: MARIANA  Relationship to Patient: SHAYLEE  Phone Number: [phone]  Reason for Call: PT CALLED IN W A QUESTION ABOUT [facility] ABOUT EMGALITY NOT BEING COVERED ABOUT GENERIC MEDICATION FIRST AND THAT SHE HAS ALREADY TRIED OTHER GENERICS THAT DID NOT WORK AND THAT THIS WOULD GO INTO EFFECT AS OF 1.1.22     PT ASKED IF THERE ARE SAMPLES AVAIL OF THE EMGALITY AND IF SHE CAN GET SOME.    PLEASE ADVISE.

## 2021-11-04 ENCOUNTER — TELEPHONE (OUTPATIENT)
Dept: NEUROLOGY | Facility: CLINIC | Age: 56
End: 2021-11-04

## 2021-11-04 NOTE — TELEPHONE ENCOUNTER
Called patient to let her know that I did the PA for her Emgality and am waiting for an answer back from that. Also relayed to her that if we can't get it approved in time, we can get a sample loading dose for her until it gets approved. She stated understanding of message.

## 2021-11-05 NOTE — TELEPHONE ENCOUNTER
Received PA back from [facility], it stated that a PA is not required. I will not be able to do another PA until 1/1/22 when her insurance will no longer pay for it.

## 2021-12-12 ENCOUNTER — APPOINTMENT (OUTPATIENT)
Dept: CT IMAGING | Facility: HOSPITAL | Age: 56
End: 2021-12-12

## 2021-12-12 ENCOUNTER — HOSPITAL ENCOUNTER (EMERGENCY)
Facility: HOSPITAL | Age: 56
Discharge: HOME | End: 2021-12-12

## 2021-12-12 ENCOUNTER — HOSPITAL ENCOUNTER (EMERGENCY)
Facility: HOSPITAL | Age: 56
Discharge: HOME | End: 2021-12-12
Admitting: EMERGENCY MEDICINE

## 2021-12-12 VITALS
TEMPERATURE: 98.1 F | BODY MASS INDEX: 34.15 KG/M2 | RESPIRATION RATE: 18 BRPM | HEART RATE: 92 BPM | SYSTOLIC BLOOD PRESSURE: 105 MMHG | HEIGHT: 64 IN | OXYGEN SATURATION: 99 % | WEIGHT: 200 LBS | DIASTOLIC BLOOD PRESSURE: 70 MMHG

## 2021-12-12 DIAGNOSIS — G25.2 COARSE TREMORS: Primary | ICD-10-CM

## 2021-12-12 LAB
ALBUMIN SERPL-MCNC: 4 G/DL (ref 3.5–5.2)
ALBUMIN/GLOB SERPL: 1.3 G/DL
ALP SERPL-CCNC: 94 U/L (ref 39–117)
ALT SERPL W P-5'-P-CCNC: 65 U/L (ref 1–33)
AMPHET+METHAMPHET UR QL: NEGATIVE
ANION GAP SERPL CALCULATED.3IONS-SCNC: 16 MMOL/L (ref 5–15)
AST SERPL-CCNC: 42 U/L (ref 1–32)
BARBITURATES UR QL SCN: NEGATIVE
BASOPHILS # BLD AUTO: 0 10*3/MM3 (ref 0–0.2)
BASOPHILS NFR BLD AUTO: 0.6 % (ref 0–1.5)
BENZODIAZ UR QL SCN: NEGATIVE
BILIRUB SERPL-MCNC: 0.3 MG/DL (ref 0–1.2)
BUN SERPL-MCNC: 17 MG/DL (ref 6–20)
BUN/CREAT SERPL: 18.9 (ref 7–25)
CALCIUM SPEC-SCNC: 9.7 MG/DL (ref 8.6–10.5)
CANNABINOIDS SERPL QL: NEGATIVE
CHLORIDE SERPL-SCNC: 104 MMOL/L (ref 98–107)
CO2 SERPL-SCNC: 19 MMOL/L (ref 22–29)
COCAINE UR QL: NEGATIVE
CREAT SERPL-MCNC: 0.9 MG/DL (ref 0.57–1)
DEPRECATED RDW RBC AUTO: 38.9 FL (ref 37–54)
EOSINOPHIL # BLD AUTO: 0.1 10*3/MM3 (ref 0–0.4)
EOSINOPHIL NFR BLD AUTO: 1.3 % (ref 0.3–6.2)
ERYTHROCYTE [DISTWIDTH] IN BLOOD BY AUTOMATED COUNT: 12.8 % (ref 12.3–15.4)
GFR SERPL CREATININE-BSD FRML MDRD: 65 ML/MIN/1.73
GLOBULIN UR ELPH-MCNC: 3.1 GM/DL
GLUCOSE SERPL-MCNC: 90 MG/DL (ref 65–99)
HCT VFR BLD AUTO: 46.8 % (ref 34–46.6)
HGB BLD-MCNC: 16.2 G/DL (ref 12–15.9)
HOLD SPECIMEN: NORMAL
HOLD SPECIMEN: NORMAL
LYMPHOCYTES # BLD AUTO: 1.4 10*3/MM3 (ref 0.7–3.1)
LYMPHOCYTES NFR BLD AUTO: 25.6 % (ref 19.6–45.3)
MAGNESIUM SERPL-MCNC: 1.8 MG/DL (ref 1.6–2.6)
MCH RBC QN AUTO: 30.4 PG (ref 26.6–33)
MCHC RBC AUTO-ENTMCNC: 34.7 G/DL (ref 31.5–35.7)
MCV RBC AUTO: 87.8 FL (ref 79–97)
METHADONE UR QL SCN: NEGATIVE
MONOCYTES # BLD AUTO: 0.6 10*3/MM3 (ref 0.1–0.9)
MONOCYTES NFR BLD AUTO: 11.6 % (ref 5–12)
NEUTROPHILS NFR BLD AUTO: 3.3 10*3/MM3 (ref 1.7–7)
NEUTROPHILS NFR BLD AUTO: 60.9 % (ref 42.7–76)
NRBC BLD AUTO-RTO: 0.1 /100 WBC (ref 0–0.2)
OPIATES UR QL: NEGATIVE
OXYCODONE UR QL SCN: NEGATIVE
PLATELET # BLD AUTO: 285 10*3/MM3 (ref 140–450)
PMV BLD AUTO: 6.6 FL (ref 6–12)
POTASSIUM SERPL-SCNC: 4.3 MMOL/L (ref 3.5–5.2)
PROT SERPL-MCNC: 7.1 G/DL (ref 6–8.5)
RBC # BLD AUTO: 5.34 10*6/MM3 (ref 3.77–5.28)
SODIUM SERPL-SCNC: 139 MMOL/L (ref 136–145)
TROPONIN T SERPL-MCNC: <0.01 NG/ML (ref 0–0.03)
WBC NRBC COR # BLD: 5.4 10*3/MM3 (ref 3.4–10.8)

## 2021-12-12 PROCEDURE — 80307 DRUG TEST PRSMV CHEM ANLYZR: CPT | Performed by: PHYSICIAN ASSISTANT

## 2021-12-12 PROCEDURE — 96374 THER/PROPH/DIAG INJ IV PUSH: CPT

## 2021-12-12 PROCEDURE — 84484 ASSAY OF TROPONIN QUANT: CPT | Performed by: PHYSICIAN ASSISTANT

## 2021-12-12 PROCEDURE — 70450 CT HEAD/BRAIN W/O DYE: CPT

## 2021-12-12 PROCEDURE — 99283 EMERGENCY DEPT VISIT LOW MDM: CPT

## 2021-12-12 PROCEDURE — 96375 TX/PRO/DX INJ NEW DRUG ADDON: CPT

## 2021-12-12 PROCEDURE — 83735 ASSAY OF MAGNESIUM: CPT | Performed by: PHYSICIAN ASSISTANT

## 2021-12-12 PROCEDURE — 25010000002 LORAZEPAM PER 2 MG: Performed by: PHYSICIAN ASSISTANT

## 2021-12-12 PROCEDURE — 80053 COMPREHEN METABOLIC PANEL: CPT | Performed by: PHYSICIAN ASSISTANT

## 2021-12-12 PROCEDURE — 36415 COLL VENOUS BLD VENIPUNCTURE: CPT

## 2021-12-12 PROCEDURE — 25010000002 ONDANSETRON PER 1 MG: Performed by: PHYSICIAN ASSISTANT

## 2021-12-12 PROCEDURE — 85025 COMPLETE CBC W/AUTO DIFF WBC: CPT | Performed by: PHYSICIAN ASSISTANT

## 2021-12-12 PROCEDURE — 99211 OFF/OP EST MAY X REQ PHY/QHP: CPT

## 2021-12-12 RX ORDER — ONDANSETRON 2 MG/ML
4 INJECTION INTRAMUSCULAR; INTRAVENOUS ONCE
Status: COMPLETED | OUTPATIENT
Start: 2021-12-12 | End: 2021-12-12

## 2021-12-12 RX ORDER — SODIUM CHLORIDE 0.9 % (FLUSH) 0.9 %
10 SYRINGE (ML) INJECTION AS NEEDED
Status: DISCONTINUED | OUTPATIENT
Start: 2021-12-12 | End: 2021-12-12 | Stop reason: HOSPADM

## 2021-12-12 RX ORDER — LORAZEPAM 2 MG/ML
1 INJECTION INTRAMUSCULAR ONCE
Status: COMPLETED | OUTPATIENT
Start: 2021-12-12 | End: 2021-12-12

## 2021-12-12 RX ADMIN — SODIUM CHLORIDE 500 ML: 9 INJECTION, SOLUTION INTRAVENOUS at 15:45

## 2021-12-12 RX ADMIN — LORAZEPAM 1 MG: 2 INJECTION INTRAMUSCULAR; INTRAVENOUS at 15:42

## 2021-12-12 RX ADMIN — ONDANSETRON 4 MG: 2 INJECTION INTRAMUSCULAR; INTRAVENOUS at 16:25

## 2021-12-12 NOTE — ED PROVIDER NOTES
Subjective   History:  56-year-old female who presents with worsening tremors.  It started this morning with what she reports felt like a tickle in her throat and progressed throughout the day to worsening full body tremors.  When she arrived at the ER initially was able to walk without difficulty had slight spasms but that was it.  By the time she was in ER bed it was full body spasms tremors or tics.  Patient denies any new cough congestion medications antipsychotic medications nausea vomiting dizziness abdominal pain    Onset: 1 day  Location: Generalized  Duration: Constant  Character: Tremors/body spasms  Aggravating/Alleviating factors: None  Radiation none  Severity: Moderate            Review of Systems   Constitutional: Negative for chills, diaphoresis, fatigue and fever.   HENT: Negative.    Respiratory: Negative for cough, choking and shortness of breath.    Cardiovascular: Negative for chest pain and palpitations.   Gastrointestinal: Negative for abdominal pain, nausea and vomiting.   Musculoskeletal: Negative.    Skin: Negative.    Neurological: Positive for tremors.   Psychiatric/Behavioral: Negative.        Past Medical History:   Diagnosis Date   • Allergic    • Asthma 11/21/2019   • Bell palsy     Rt Side   • Heart murmur    • Kidney stone    • MAMMO     NEG = 2018/ 2019   • Migraine     MIGRAINES   • Obesity 11/21/2019   • PAP     NEG = 2018/ 2019   • Raynaud phenomenon        Allergies   Allergen Reactions   • Prednisone Mental Status Change     Psychosis   • Latex Rash   • Oxycodone-Acetaminophen Rash     CAN TAKE WITH BENADRYL       Past Surgical History:   Procedure Laterality Date   • APPENDECTOMY  07/2000   • APPENDECTOMY      20 years ago    • COLONOSCOPY      Cologuard ----2019= NEG, rech 2022   • CYSTOSCOPY BLADDER STONE LITHOTRIPSY  2019/ 2020    x 2       Family History   Problem Relation Age of Onset   • Arthritis Mother    • Osteoporosis Mother    • Stroke Mother    • Hypertension  Mother    • Arthritis Father    • Mental illness Father         PTSD   • Hyperlipidemia Father    • Kidney disease Father         RTA   • Cancer Father         Tongue Cancer   • Obesity Father    • Hypertension Father    • Kidney nephrosis Father    • Hypertension Brother    • Kidney nephrosis Brother    • Migraines Daughter    • Hyperlipidemia Son    • Kidney disease Son    • Migraines Maternal Aunt    • Hyperlipidemia Maternal Uncle    • Heart attack Maternal Uncle    • Arthritis Maternal Grandmother    • Cancer Maternal Grandmother    • Obesity Maternal Grandmother    • Breast cancer Maternal Grandmother    • Cancer Maternal Grandfather    • Stroke Paternal Grandfather        Social History     Socioeconomic History   • Marital status:    Tobacco Use   • Smoking status: Never Smoker   • Smokeless tobacco: Never Used   Vaping Use   • Vaping Use: Never used   Substance and Sexual Activity   • Alcohol use: No   • Drug use: No   • Sexual activity: Defer           Objective   Physical Exam  Vitals and nursing note reviewed.   Constitutional:       Appearance: Normal appearance. She is well-developed.   HENT:      Head: Normocephalic and atraumatic.      Nose: Nose normal.   Eyes:      Conjunctiva/sclera: Conjunctivae normal.   Cardiovascular:      Rate and Rhythm: Normal rate and regular rhythm.   Pulmonary:      Effort: Pulmonary effort is normal. No respiratory distress.      Breath sounds: Normal breath sounds. No stridor. No wheezing, rhonchi or rales.   Musculoskeletal:         General: Normal range of motion.      Cervical back: Normal range of motion.   Skin:     General: Skin is warm and dry.   Neurological:      General: No focal deficit present.      Mental Status: She is alert and oriented to person, place, and time. Mental status is at baseline.      Cranial Nerves: No cranial nerve deficit.      Sensory: No sensory deficit.      Comments: Initial evaluation was difficult to perform secondary to  full body shaking and tremors. Patient unable to control symptoms. Movements are not synchronized   Psychiatric:         Mood and Affect: Mood normal.         Behavior: Behavior normal.         Thought Content: Thought content normal.         Judgment: Judgment normal.         Procedures           ED Course  ED Course as of 12/12/21 1715   Sun Dec 12, 2021   1702 Tremors and shaking have subsided on re-evaluation with medication and treatment [MG]      ED Course User Index  [MG] Tatiana Villeda PA-C                                                 MDM  Number of Diagnoses or Management Options  Coarse tremors  Diagnosis management comments: I examined the patient using the appropriate personal protective equipment.      DISPOSITION:   Chart Review:  Comorbidity:  has a past medical history of Allergic, Asthma (11/21/2019), Bell palsy, Heart murmur, Kidney stone, MAMMO, Migraine, Obesity (11/21/2019), PAP, and Raynaud phenomenon.  Differentials:this list is not all inclusive and does not constitute the entirety of considered causes --> Intracranial abnormality, electrolyte abnormality, medication side effect    Imaging: Was interpreted by physician and reviewed by myself:  CT Head Without Contrast    Result Date: 12/12/2021    1.  Bifrontal parenchymal volume loss.  No acute intracranial abnormality.  Electronically Signed By-Alexis Brown MD On:12/12/2021 5:01 PM This report was finalized on [id number] by  Alexis Brown MD.      Disposition/Treatment:  Patient is a 56-year-old female who presents with full body tremors. she was given Ativan which completely alleviated her symptoms.  Lab work was all fairly unremarkable along with imaging.  I recommended that she follow-up with neurology tomorrow morning she has a neurologist.  She was alert and oriented during her symptoms.  Her symptoms progressed from feeling like a tickle in her throat, body shakes and tremors to full body tremors.  She was stable at time of  discharge return precaution follow-up instruction were bided she was stable in agreement with plan         Amount and/or Complexity of Data Reviewed  Clinical lab tests: reviewed  Tests in the radiology section of CPT®: reviewed  Decide to obtain previous medical records or to obtain history from someone other than the patient: yes    Patient Progress  Patient progress: stable      Final diagnoses:   Coarse tremors       ED Disposition  ED Disposition     ED Disposition Condition Comment    Discharge Stable           Seipel, Joseph F, MD  [address] [address]  [phone]    Schedule an appointment as soon as possible for a visit            Medication List      No changes were made to your prescriptions during this visit.          Tatiana Villeda PA-C  12/12/21 0181

## 2021-12-12 NOTE — DISCHARGE INSTRUCTIONS
"Return to the ER for any worsening symptoms    ** ANY CONDITION CAN CHANGE, despite proper treatment.    ** IF YOUR SYMPTOMS WORSEN, CHANGE, or PERSIST,   then get to the nearest Emergency Department (ER), call your PCP, or return to Urgent Care.    ** FOLLOW-UP CARE IS YOUR RESPONSIBILITY.    Discharge from Urgent Care today does NOT mean that nothing is wrong, but it indicates no emergency is identified.  Urgent Care is NOT a substitute for your primary care provider/physician (PCP), additional evaluation may be needed.     ** ALWAYS FOLLOW-UP WITH YOUR OWN PHYSICIAN / PRIMARY CARE PROVIDER (PCP)  to review this Urgent Care visit, and for review and possible repeat blood or urine tests, x-rays, or other diagnostics.      ** COMPLETE ALL TESTS & SCHEDULE ALL REFERRALS ordered or recommended by Urgent Care.  If you need help making an appointment within the recommended time frame, then please call us for help!  For assistance arranging a PCP appointment, call [phone] option 2.  [facility] may call to   assist with some referrals.    ** CONTACT URGENT CARE for ALL TEST, X-RAY, and other RESULTS within 3 DAYS.    Please do NOT assume that \"no news is good news.\"    ** ALL MEDICATIONS CAN HAVE SIDE EFFECTS & INTERACTIONS.  Please review these, and ask your pharmacist or contact your primary care provider for questions or concerns.   Be sure that UC, your pharmacist, and your PCP have a complete list of all medications and supplements you're taking.        "

## 2021-12-13 ENCOUNTER — TELEPHONE (OUTPATIENT)
Dept: NEUROLOGY | Facility: CLINIC | Age: 56
End: 2021-12-13

## 2021-12-13 NOTE — TELEPHONE ENCOUNTER
"PT CALLED IN STATING SHE WANTED TO SEE DR.SEIPEL FOR A HOSPITAL F/U - I ADVISED HER SHE IS A /ESPERANZA PT SO I WOULDN'T BE ABLE TO DO WITHOUT TRANSFERRING ALL CARE TO DR.SEIPEL, SHE GOT UPSET BECAUSE HER APPOINTMENT WITH  WASN'T UNTIL MARCH, I ADVISED 'S BRODERICK.CHAMP DIONNA HAD STATED SHE CAN BE SEEN SOONER IF SHE WANTED TO BY ESPERANZA INGRAM APRN. SHE KEPT CALLING ESPERANZA A NURSE AND SAID THAT DOESN'T MAKE HER HAPPY WITH HER CARE. I ADVISED THEN WE CAN START THE TRANSFER PROCESS IF NEEDED BUT SHE ASKED ME IF DR.SEIPEL PUTS HIS PT'S OFF ON \"NURSES\" I ADVISE THAT DR.SEIPEL HAS A APRN AND YES HE DOES SHARE PTS WITH HER. SHE THEN AGREED TO SEE ESPERANZA, I CALLED OVER TO CLINIC AND SPOKE WITH SHERLYN , I ASKED IF WE CAN SWITCH TO EARLIER APPOINTMENT WITH ESPERANZA, SHE WAS ABLE TO GET 1-18-21 @ 4PM. PT AGREED, BEFORE HANGING UP SHE SAID \" SO I SEE ESPERANZA IN JAN AND  IN MARCH AND IF HE GETS SOONER I CAN SEE HIM, I ADVISED HER THE APPOINTMENT WAS CX'D D/T GETTING IN WITH ESPERANZA SOONER. SHE GOT UPSET AND ASKED WHY, I ADVISED THE HOSPITAL F/U ONLY NEEDED ONE F/U.     SHE ENDED THE CONVERSATION WITH \"I WILL BE FILLING OUT A REPORT ABOUT THIS AND ENDED CONVERSATION.   "

## 2021-12-13 NOTE — TELEPHONE ENCOUNTER
Called pt. Dr. Dasilva doesn't have anything until march. Asked if she wanted to see Tran since she is already scheduled with Tran for follow up and send her once. She stated she wanted to see a Neurologist no an NP. I explained ok but it will be march and we can put you on cancellation list. She said she would call ER back to get Name of Doctor they suggest somewhere else. I explained she is more than welcome to look else where but we still do not have anything until march. She stated okay scheudled in march. Pt still looking for another place to go. Please advise thank you.

## 2021-12-13 NOTE — TELEPHONE ENCOUNTER
Provider:   Caller: PATIENT  Relationship to Patient: SELF  Pharmacy: NA  Phone Number: [phone]  Reason for Call: PATIENT WAS SEEN IN ER YESTERDAY FOR TREMORS. WAS TOLD TO FOLLOW UP WITH NEUROLOGIST. SHE IS ESTABLISHED WITH . PLEASE REVIEW AND ADVISE.  When was the patient last seen: 9-28-21

## 2022-01-03 RX ORDER — MONTELUKAST SODIUM 10 MG/1
TABLET ORAL
Qty: 90 TABLET | Refills: 0 | Status: SHIPPED | OUTPATIENT
Start: 2022-01-03 | End: 2022-01-25 | Stop reason: SDUPTHER

## 2022-01-03 NOTE — TELEPHONE ENCOUNTER
Rx Refill Note  Requested Prescriptions     Pending Prescriptions Disp Refills   • montelukast (SINGULAIR) 10 MG tablet [Pharmacy Med Name: MONTELUKAST SOD 10 MG TABLET] 90 tablet 0     Sig: TAKE 1 TABLET BY MOUTH EVERY DAY      Last office visit with prescribing clinician: 5/6/2021      Next office visit with prescribing clinician: Visit date not found     Comprehensive Metabolic Panel (12/12/2021 15:58)  Lipid Panel (11/18/2020 08:23)         Bushra Arrington CMA  01/03/22, 08:36 EST

## 2022-01-16 ENCOUNTER — APPOINTMENT (OUTPATIENT)
Dept: CT IMAGING | Facility: HOSPITAL | Age: 57
End: 2022-01-16

## 2022-01-16 ENCOUNTER — HOSPITAL ENCOUNTER (EMERGENCY)
Facility: HOSPITAL | Age: 57
Discharge: HOME | End: 2022-01-16
Attending: EMERGENCY MEDICINE | Admitting: EMERGENCY MEDICINE

## 2022-01-16 VITALS
OXYGEN SATURATION: 96 % | HEART RATE: 85 BPM | BODY MASS INDEX: 33.36 KG/M2 | TEMPERATURE: 98 F | DIASTOLIC BLOOD PRESSURE: 75 MMHG | WEIGHT: 195.4 LBS | RESPIRATION RATE: 18 BRPM | HEIGHT: 64 IN | SYSTOLIC BLOOD PRESSURE: 133 MMHG

## 2022-01-16 DIAGNOSIS — N20.1 RIGHT URETERAL STONE: Primary | ICD-10-CM

## 2022-01-16 LAB
ALBUMIN SERPL-MCNC: 4.2 G/DL (ref 3.5–5.2)
ALBUMIN/GLOB SERPL: 1.4 G/DL
ALP SERPL-CCNC: 101 U/L (ref 39–117)
ALT SERPL W P-5'-P-CCNC: 24 U/L (ref 1–33)
ANION GAP SERPL CALCULATED.3IONS-SCNC: 14 MMOL/L (ref 5–15)
AST SERPL-CCNC: 23 U/L (ref 1–32)
B-HCG UR QL: NEGATIVE
BACTERIA UR QL AUTO: ABNORMAL /HPF
BASOPHILS # BLD AUTO: 0.1 10*3/MM3 (ref 0–0.2)
BASOPHILS NFR BLD AUTO: 1 % (ref 0–1.5)
BILIRUB SERPL-MCNC: 0.3 MG/DL (ref 0–1.2)
BILIRUB UR QL STRIP: NEGATIVE
BUN SERPL-MCNC: 19 MG/DL (ref 6–20)
BUN/CREAT SERPL: 25.3 (ref 7–25)
CALCIUM SPEC-SCNC: 10.1 MG/DL (ref 8.6–10.5)
CHLORIDE SERPL-SCNC: 103 MMOL/L (ref 98–107)
CLARITY UR: CLEAR
CO2 SERPL-SCNC: 25 MMOL/L (ref 22–29)
COLOR UR: ABNORMAL
CREAT SERPL-MCNC: 0.75 MG/DL (ref 0.57–1)
DEPRECATED RDW RBC AUTO: 38.5 FL (ref 37–54)
EOSINOPHIL # BLD AUTO: 0.1 10*3/MM3 (ref 0–0.4)
EOSINOPHIL NFR BLD AUTO: 1.8 % (ref 0.3–6.2)
ERYTHROCYTE [DISTWIDTH] IN BLOOD BY AUTOMATED COUNT: 12.9 % (ref 12.3–15.4)
GFR SERPL CREATININE-BSD FRML MDRD: 81 ML/MIN/1.73
GLOBULIN UR ELPH-MCNC: 2.9 GM/DL
GLUCOSE SERPL-MCNC: 126 MG/DL (ref 65–99)
GLUCOSE UR STRIP-MCNC: NEGATIVE MG/DL
HCT VFR BLD AUTO: 44 % (ref 34–46.6)
HGB BLD-MCNC: 15.3 G/DL (ref 12–15.9)
HGB UR QL STRIP.AUTO: ABNORMAL
HOLD SPECIMEN: NORMAL
HYALINE CASTS UR QL AUTO: ABNORMAL /LPF
KETONES UR QL STRIP: NEGATIVE
LEUKOCYTE ESTERASE UR QL STRIP.AUTO: NEGATIVE
LYMPHOCYTES # BLD AUTO: 1.3 10*3/MM3 (ref 0.7–3.1)
LYMPHOCYTES NFR BLD AUTO: 19.1 % (ref 19.6–45.3)
MCH RBC QN AUTO: 29.5 PG (ref 26.6–33)
MCHC RBC AUTO-ENTMCNC: 34.8 G/DL (ref 31.5–35.7)
MCV RBC AUTO: 84.9 FL (ref 79–97)
MONOCYTES # BLD AUTO: 0.5 10*3/MM3 (ref 0.1–0.9)
MONOCYTES NFR BLD AUTO: 7.7 % (ref 5–12)
NEUTROPHILS NFR BLD AUTO: 4.7 10*3/MM3 (ref 1.7–7)
NEUTROPHILS NFR BLD AUTO: 70.4 % (ref 42.7–76)
NITRITE UR QL STRIP: NEGATIVE
NRBC BLD AUTO-RTO: 0.1 /100 WBC (ref 0–0.2)
PH UR STRIP.AUTO: 7 [PH] (ref 5–8)
PLATELET # BLD AUTO: 289 10*3/MM3 (ref 140–450)
PMV BLD AUTO: 6.8 FL (ref 6–12)
POTASSIUM SERPL-SCNC: 4.2 MMOL/L (ref 3.5–5.2)
PROT SERPL-MCNC: 7.1 G/DL (ref 6–8.5)
PROT UR QL STRIP: NEGATIVE
RBC # BLD AUTO: 5.18 10*6/MM3 (ref 3.77–5.28)
RBC # UR STRIP: ABNORMAL /HPF
REF LAB TEST METHOD: ABNORMAL
SARS-COV-2 RNA PNL SPEC NAA+PROBE: NOT DETECTED
SODIUM SERPL-SCNC: 142 MMOL/L (ref 136–145)
SP GR UR STRIP: 1.01 (ref 1–1.03)
SQUAMOUS #/AREA URNS HPF: ABNORMAL /HPF
UROBILINOGEN UR QL STRIP: ABNORMAL
WBC # UR STRIP: ABNORMAL /HPF
WBC NRBC COR # BLD: 6.7 10*3/MM3 (ref 3.4–10.8)
WHOLE BLOOD HOLD SPECIMEN: NORMAL

## 2022-01-16 PROCEDURE — 74176 CT ABD & PELVIS W/O CONTRAST: CPT

## 2022-01-16 PROCEDURE — C9803 HOPD COVID-19 SPEC COLLECT: HCPCS

## 2022-01-16 PROCEDURE — 25010000002 ONDANSETRON PER 1 MG: Performed by: EMERGENCY MEDICINE

## 2022-01-16 PROCEDURE — 96374 THER/PROPH/DIAG INJ IV PUSH: CPT

## 2022-01-16 PROCEDURE — 96375 TX/PRO/DX INJ NEW DRUG ADDON: CPT

## 2022-01-16 PROCEDURE — 87635 SARS-COV-2 COVID-19 AMP PRB: CPT

## 2022-01-16 PROCEDURE — 80053 COMPREHEN METABOLIC PANEL: CPT

## 2022-01-16 PROCEDURE — 81001 URINALYSIS AUTO W/SCOPE: CPT

## 2022-01-16 PROCEDURE — 81025 URINE PREGNANCY TEST: CPT

## 2022-01-16 PROCEDURE — 99283 EMERGENCY DEPT VISIT LOW MDM: CPT

## 2022-01-16 PROCEDURE — 25010000002 KETOROLAC TROMETHAMINE PER 15 MG: Performed by: EMERGENCY MEDICINE

## 2022-01-16 PROCEDURE — 85025 COMPLETE CBC W/AUTO DIFF WBC: CPT

## 2022-01-16 PROCEDURE — 36415 COLL VENOUS BLD VENIPUNCTURE: CPT

## 2022-01-16 RX ORDER — TAMSULOSIN HYDROCHLORIDE 0.4 MG/1
1 CAPSULE ORAL DAILY
Qty: 30 CAPSULE | Refills: 0 | Status: SHIPPED | OUTPATIENT
Start: 2022-01-16 | End: 2022-02-19

## 2022-01-16 RX ORDER — ONDANSETRON 4 MG/1
4 TABLET, FILM COATED ORAL EVERY 8 HOURS PRN
Qty: 20 TABLET | Refills: 0 | Status: SHIPPED | OUTPATIENT
Start: 2022-01-16 | End: 2022-01-25

## 2022-01-16 RX ORDER — TAMSULOSIN HYDROCHLORIDE 0.4 MG/1
1 CAPSULE ORAL DAILY
Qty: 30 CAPSULE | Refills: 0 | Status: SHIPPED | OUTPATIENT
Start: 2022-01-16 | End: 2022-01-16 | Stop reason: SDUPTHER

## 2022-01-16 RX ORDER — KETOROLAC TROMETHAMINE 30 MG/ML
30 INJECTION, SOLUTION INTRAMUSCULAR; INTRAVENOUS ONCE
Status: COMPLETED | OUTPATIENT
Start: 2022-01-16 | End: 2022-01-16

## 2022-01-16 RX ORDER — ONDANSETRON 2 MG/ML
4 INJECTION INTRAMUSCULAR; INTRAVENOUS ONCE
Status: COMPLETED | OUTPATIENT
Start: 2022-01-16 | End: 2022-01-16

## 2022-01-16 RX ORDER — ONDANSETRON 4 MG/1
4 TABLET, FILM COATED ORAL EVERY 8 HOURS PRN
Qty: 20 TABLET | Refills: 0 | Status: SHIPPED | OUTPATIENT
Start: 2022-01-16 | End: 2022-01-16 | Stop reason: SDUPTHER

## 2022-01-16 RX ADMIN — KETOROLAC TROMETHAMINE 30 MG: 30 INJECTION, SOLUTION INTRAMUSCULAR; INTRAVENOUS at 05:14

## 2022-01-16 RX ADMIN — ONDANSETRON 4 MG: 2 INJECTION INTRAMUSCULAR; INTRAVENOUS at 05:14

## 2022-01-16 RX ADMIN — SODIUM CHLORIDE 1000 ML: 9 INJECTION, SOLUTION INTRAVENOUS at 05:53

## 2022-01-16 NOTE — ED PROVIDER NOTES
Subjective   56-year-old female with history of kidney stones complains of suspected recurrence with moderate right flank pain radiating to right groin with some dysuria and urgency, no aggravating alleviating factors, associated with nausea.          Review of Systems   Gastrointestinal: Positive for abdominal pain and nausea.   Genitourinary: Positive for dysuria and flank pain.   All other systems reviewed and are negative.      Past Medical History:   Diagnosis Date   • Allergic    • Asthma 11/21/2019   • Bell palsy     Rt Side   • Heart murmur    • Kidney stone    • MAMMO     NEG = 2018/ 2019   • Migraine     MIGRAINES   • Obesity 11/21/2019   • PAP     NEG = 2018/ 2019   • Raynaud phenomenon        Allergies   Allergen Reactions   • Prednisone Mental Status Change     Psychosis   • Latex Rash   • Oxycodone-Acetaminophen Rash     CAN TAKE WITH BENADRYL       Past Surgical History:   Procedure Laterality Date   • APPENDECTOMY  07/2000   • APPENDECTOMY      20 years ago    • COLONOSCOPY      Cologuard ----2019= NEG, rech 2022   • CYSTOSCOPY BLADDER STONE LITHOTRIPSY  2019/ 2020    x 2       Family History   Problem Relation Age of Onset   • Arthritis Mother    • Osteoporosis Mother    • Stroke Mother    • Hypertension Mother    • Arthritis Father    • Mental illness Father         PTSD   • Hyperlipidemia Father    • Kidney disease Father         RTA   • Cancer Father         Tongue Cancer   • Obesity Father    • Hypertension Father    • Kidney nephrosis Father    • Hypertension Brother    • Kidney nephrosis Brother    • Migraines Daughter    • Hyperlipidemia Son    • Kidney disease Son    • Migraines Maternal Aunt    • Hyperlipidemia Maternal Uncle    • Heart attack Maternal Uncle    • Arthritis Maternal Grandmother    • Cancer Maternal Grandmother    • Obesity Maternal Grandmother    • Breast cancer Maternal Grandmother    • Cancer Maternal Grandfather    • Stroke Paternal Grandfather        Social History      Socioeconomic History   • Marital status:    Tobacco Use   • Smoking status: Never Smoker   • Smokeless tobacco: Never Used   Vaping Use   • Vaping Use: Never used   Substance and Sexual Activity   • Alcohol use: No   • Drug use: No   • Sexual activity: Defer           Objective   Physical Exam  Constitutional:       Appearance: Normal appearance.   HENT:      Head: Normocephalic and atraumatic.      Mouth/Throat:      Mouth: Mucous membranes are moist.      Pharynx: Oropharynx is clear.   Eyes:      Conjunctiva/sclera: Conjunctivae normal.      Pupils: Pupils are equal, round, and reactive to light.   Cardiovascular:      Rate and Rhythm: Normal rate and regular rhythm.      Heart sounds: Normal heart sounds.   Pulmonary:      Effort: Pulmonary effort is normal.      Breath sounds: Normal breath sounds.   Abdominal:      General: Bowel sounds are normal. There is no distension.      Palpations: Abdomen is soft.      Tenderness: There is no abdominal tenderness.   Musculoskeletal:         General: No swelling or tenderness. Normal range of motion.   Skin:     General: Skin is warm and dry.      Capillary Refill: Capillary refill takes less than 2 seconds.   Neurological:      General: No focal deficit present.      Mental Status: She is alert and oriented to person, place, and time.   Psychiatric:         Mood and Affect: Mood normal.         Behavior: Behavior normal.         Procedures           ED Course                                                 MDM  Number of Diagnoses or Management Options  Right ureteral stone  Diagnosis management comments: Results for orders placed or performed during the hospital encounter of 01/16/22  -COVID-19,CEPHEID/CRISS,COR/MELI/PAD/ELIZABETH IN-HOUSE(OR EMERGENT/ADD-ON),NP SWAB IN TRANSPORT MEDIA 3-4 HR TAT, RT-PCR - Swab, Nasopharynx:   Specimen: Nasopharynx; Swab       Result                      Value             Ref Range           COVID19                     Not  Detected      Not Detected*  -Urinalysis With Culture If Indicated - Urine, Clean Catch:   Specimen: Urine, Clean Catch       Result                      Value             Ref Range           Color, UA                                     Yellow, Straw   Dark Yellow (A)       Appearance, UA              Clear             Clear               pH, UA                      7.0               5.0 - 8.0           Specific [place], UA        1.015             1.005 - 1.030       Glucose, UA                 Negative          Negative            Ketones, UA                 Negative          Negative            Bilirubin, UA               Negative          Negative            Blood, UA                   Trace (A)         Negative            Protein, UA                 Negative          Negative            Leuk Esterase, UA           Negative          Negative            Nitrite, UA                 Negative          Negative            Urobilinogen, UA            0.2 E.U./dL       0.2 - 1.0 E.*  -Pregnancy, Urine - Urine, Clean Catch:   Specimen: Urine, Clean Catch       Result                      Value             Ref Range           HCG, Urine QL               Negative          Negative       -Urinalysis, Microscopic Only - Urine, Clean Catch:   Specimen: Urine, Clean Catch       Result                      Value             Ref Range           RBC, UA                     3-5 (A)           None Seen /H*       WBC, UA                     0-2 (A)           None Seen /H*       Bacteria, UA                None Seen         None Seen /H*       Squamous Epithelial Ce*     0-2               None Seen, 0*       Hyaline Casts, UA           None Seen         None Seen /L*       Methodology                                                   Automated Microscopy  -Comprehensive Metabolic Panel:   Specimen: Blood       Result                      Value             Ref Range           Glucose                     126 (H)           65 - 99  mg/dL       BUN                         19                6 - 20 mg/dL        Creatinine                  0.75              0.57 - 1.00 *       Sodium                      142               136 - 145 mm*       Potassium                   4.2               3.5 - 5.2 mm*       Chloride                    103               98 - 107 mmo*       CO2                         25.0              22.0 - 29.0 *       Calcium                     10.1              8.6 - 10.5 m*       Total Protein               7.1               6.0 - 8.5 g/*       Albumin                     4.20              3.50 - 5.20 *       ALT (SGPT)                  24                1 - 33 U/L          AST (SGOT)                  23                1 - 32 U/L          Alkaline Phosphatase        101               39 - 117 U/L        Total Bilirubin             0.3               0.0 - 1.2 mg*       eGFR Non  Amer       81                >60 mL/min/1*       Globulin                    2.9               gm/dL               A/G Ratio                   1.4               g/dL                BUN/Creatinine Ratio        25.3 (H)          7.0 - 25.0          Anion Gap                   14.0              5.0 - 15.0 m*  -CBC Auto Differential:   Specimen: Arm, Left; Blood       Result                      Value             Ref Range           WBC                         6.70              3.40 - 10.80*       RBC                         5.18              3.77 - 5.28 *       Hemoglobin                  15.3              12.0 - 15.9 *       Hematocrit                  44.0              34.0 - 46.6 %       MCV                         84.9              79.0 - 97.0 *       MCH                         29.5              26.6 - 33.0 *       MCHC                        34.8              31.5 - 35.7 *       RDW                         12.9              12.3 - 15.4 %       RDW-SD                      38.5              37.0 - 54.0 *       MPV                         6.8                6.0 - 12.0 fL       Platelets                   289               140 - 450 10*       Neutrophil %                70.4              42.7 - 76.0 %       Lymphocyte %                19.1 (L)          19.6 - 45.3 %       Monocyte %                  7.7               5.0 - 12.0 %        Eosinophil %                1.8               0.3 - 6.2 %         Basophil %                  1.0               0.0 - 1.5 %         Neutrophils, Absolute       4.70              1.70 - 7.00 *       Lymphocytes, Absolute       1.30              0.70 - 3.10 *       Monocytes, Absolute         0.50              0.10 - 0.90 *       Eosinophils, Absolute       0.10              0.00 - 0.40 *       Basophils, Absolute         0.10              0.00 - 0.20 *       nRBC                        0.1               0.0 - 0.2 /1*  -Gold Top - SST:        Result                      Value             Ref Range           Extra Tube                                                    Hold for add-ons.  -Light Blue Top:        Result                      Value             Ref Range           Extra Tube                                                    hold for add-on  CT Abdomen Pelvis Without Contrast   Final Result        1.  Moderate right hydroureteronephrosis and a 4 mm stone in the distal right ureter        Electronically signed by:  Valarie Monique M.D.      1/16/2022 3:52 AM     Patient well, pain relieved, patient tells me she has plenty of narcotics at home and declines I prescribe her anything, will follow-up with urology.       Amount and/or Complexity of Data Reviewed  Clinical lab tests: ordered and reviewed  Tests in the radiology section of CPT®: reviewed and ordered  Tests in the medicine section of CPT®: reviewed and ordered        Final diagnoses:   Right ureteral stone       ED Disposition  ED Disposition     ED Disposition Condition Comment    Discharge Stable           [facility]  [address]  [place] [id number]  [phone]  Schedule an appointment as soon as possible for a visit            Medication List      New Prescriptions    ondansetron 4 MG tablet  Commonly known as: ZOFRAN  Take 1 tablet by mouth Every 8 (Eight) Hours As Needed for Nausea or Vomiting.     tamsulosin 0.4 MG capsule 24 hr capsule  Commonly known as: FLOMAX  Take 1 capsule by mouth Daily.           Where to Get Your Medications      These medications were sent to [facility] [place] - [address] [phone] [facility] [phone] [address] [place] [address]    Phone: [phone]   · ondansetron 4 MG tablet  · tamsulosin 0.4 MG capsule 24 hr capsule          Shant Badillo MD  01/16/22 0647

## 2022-01-16 NOTE — ED NOTES
Pt reports right sided flank pain. Pt states its a kidney stone because she has them so often and it feels the same as it usually does.     Jose Alberto Ye, RN  01/16/22 7220

## 2022-01-18 ENCOUNTER — OFFICE VISIT (OUTPATIENT)
Dept: NEUROLOGY | Facility: CLINIC | Age: 57
End: 2022-01-18

## 2022-01-18 VITALS
OXYGEN SATURATION: 100 % | BODY MASS INDEX: 33.29 KG/M2 | HEIGHT: 64 IN | HEART RATE: 113 BPM | DIASTOLIC BLOOD PRESSURE: 82 MMHG | WEIGHT: 195 LBS | SYSTOLIC BLOOD PRESSURE: 132 MMHG

## 2022-01-18 DIAGNOSIS — R25.9 ABNORMAL MOVEMENTS: ICD-10-CM

## 2022-01-18 DIAGNOSIS — Z86.69 HISTORY OF BELL'S PALSY: ICD-10-CM

## 2022-01-18 DIAGNOSIS — Z87.442 HISTORY OF KIDNEY STONES: ICD-10-CM

## 2022-01-18 DIAGNOSIS — G43.109 MIGRAINE WITH AURA, NOT INTRACTABLE, WITHOUT STATUS MIGRAINOSUS: ICD-10-CM

## 2022-01-18 PROCEDURE — 99215 OFFICE O/P EST HI 40 MIN: CPT | Performed by: NURSE PRACTITIONER

## 2022-01-18 NOTE — PROGRESS NOTES
"DOS: 2022  NAME: Stormy Morrow   : 1965  PCP: Nicole Tracy DO    Chief Complaint   Patient presents with   • Tremors   • Hospital Follow Up Visit      SUBJECTIVE  Neurological Problem:  56 y.o. RHW female with migraines, asthma, MVP, Raynauds and h/o [place] palsy (right side) and kidney stones who presents today for hospital follow-up. She is unaccompanied.    Interval History:   **For previous detailed history, please see progress note dated 2021.    Ms. Morrow is a longtime patient of Dr. Dasilva, followed for migraine headaches, last seen by me in 2021, doing well on Emgality, magnesium and B complex for prevention.  For rescue, she uses Aleve sparingly.  At last appt she did report having some breakthrough headaches due to difficulties getting her Emgality.      Unfortunately, patient presented to the hospital on 2 occasions since her last visit. Review of records indicate that on , she presented to [facility] ED with complaints of \"tickle in her throat that progressed throughout the day to worsening full body tremors \".   ER documentation indicates she was \"having full body tremors or tics\".  CT head showed no acute findings and lab work was essentially unremarkable.  Apparently called the office after leaving the ED, to schedule a follow-up appointment as recommended; however, she was very disgruntled as Dr. Dasilva schedule was out until March, she expressed dissatisfaction with having to see a \"nurse \".  She was offered to transfer care to Dr. Seipel in [place] which she declined.   She was seen again in the [place] ED 2 days ago on 2022 for complaints of flank pain, found to have a 4 mm stone in the distal right ureter.    SHe tells me today that the her \"all body tremors\" started with a \"twitch\" in her neck the day prior that progressed to her having involuntary movements in all of her extremities.  She demonstrated that both upper and lower extremities would flail, " "equally, no worse on one side or the other, which continued while she was in the ER waiting room, affected her voice, No vision issues, no eye deviation. She states she could still perform some voluntary actions such as push her glasses up on her face, she could still walk \"but not well\". This continued until she had received ativan, which resolved \"the big movements\" in her arms and legs but she continued with a \"twitch\" in her neck and head.  This has never happened before nor has it occurred since. No known seizure history. She did have some urinary leakage which is not uncommon for her.buy no bowel incontience and no biting of tongue. No LOC, she was aware and conscious the entire time.  She did not think to video record the episode.  She reports feeling fatigued afterward but adds \"I am always fatigued \".  She thinks it may be related to her covid booster which she received on Nov. 19th.     She denies any changes in medications, no changes in her health other than recent kidney stone.  In regards to headaches, she continues with emgality, have used aleve about 6 times in the last 30 days which helps decrease the intensity of pain.   No personal or family history of seizures, epilepsy       Review of Systems:Review of Systems   Neurological: Positive for tremors and headaches. Negative for dizziness, seizures, syncope, facial asymmetry, speech difficulty, weakness, light-headedness and numbness.   Psychiatric/Behavioral: Negative for agitation, behavioral problems, confusion, decreased concentration, dysphoric mood, hallucinations, self-injury, sleep disturbance and suicidal ideas. The patient is not nervous/anxious and is not hyperactive.     Above ROS reviewed    The following portions of the patient's history were reviewed and updated as appropriate: allergies, current medications, past family history, past medical history, past social history, past surgical history and problem list.    Current Medications: "   Current Outpatient Medications:   •  albuterol sulfate  (90 Base) MCG/ACT inhaler, Inhale 2 puffs Every 6 (Six) Hours As Needed for Wheezing or Shortness of Air., Disp: 18 g, Rfl: 0  •  Cetirizine HCl 10 MG capsule, Take 1 capsule by mouth Daily., Disp: 90 capsule, Rfl: 3  •  chlorthalidone (HYGROTON) 25 MG tablet, Take 12.5 mg by mouth Daily., Disp: , Rfl:   •  diphenhydrAMINE (BENADRYL) 25 mg capsule, Take 1-2 capsules by mouth Every 6 (Six) Hours As Needed for Itching., Disp: 30 capsule, Rfl: 0  •  EPINEPHrine (EPIPEN) 0.3 MG/0.3ML solution auto-injector injection, INJECT 0.3 ML UNDER THE SKIN INTO THE APPROPRIATE AREA AS DIRECTED 1 (ONE) TIME FOR 1 DOSE., Disp: , Rfl:   •  Flonase Sensimist 27.5 MCG/SPRAY nasal spray, INHALE 2 PUFFS NASALLY AS DIRECTED, Disp: 10 g, Rfl: 0  •  galcanezumab-gnlm (Emgality) 120 MG/ML auto-injector pen, Inject 1 mL under the skin into the appropriate area as directed Every 30 (Thirty) Days., Disp: 1 pen, Rfl: 11  •  Magnesium 100 MG capsule, Take  by mouth., Disp: , Rfl:   •  montelukast (SINGULAIR) 10 MG tablet, TAKE 1 TABLET BY MOUTH EVERY DAY, Disp: 90 tablet, Rfl: 0  •  Nutritional Supplements (THERALITH XR) tablet, Take 2 tablets by mouth 2 (Two) Times a Day., Disp: , Rfl:   •  potassium chloride (MICRO-K) 10 MEQ CR capsule, Take 10 mEq by mouth Daily., Disp: , Rfl:   •  tamsulosin (FLOMAX) 0.4 MG capsule 24 hr capsule, Take 1 capsule by mouth Daily., Disp: 30 capsule, Rfl: 0  •  famotidine (PEPCID) 20 MG tablet, Take 20 mg by mouth 2 (Two) Times a Day As Needed for Heartburn., Disp: , Rfl:   •  ondansetron (ZOFRAN) 4 MG tablet, Take 1 tablet by mouth Every 8 (Eight) Hours As Needed for Nausea or Vomiting., Disp: 20 tablet, Rfl: 0  **I did not stop or change the above medications.  Patient's medication list was updated to reflect medications they have reported as currently taking, including medication changes made by other providers.    OBJECTIVE  Vitals:    01/18/22  1511   BP: 132/82   Pulse: 113   SpO2: 100%     Body mass index is 33.45 kg/m².    Diagnostics:    Laboratory Results:         Lab Results   Component Value Date    WBC 6.70 01/16/2022    HGB 15.3 01/16/2022    HCT 44.0 01/16/2022    MCV 84.9 01/16/2022     01/16/2022     Lab Results   Component Value Date    GLUCOSE 126 (H) 01/16/2022    BUN 19 01/16/2022    CREATININE 0.75 01/16/2022    EGFRIFNONA 81 01/16/2022    BCR 25.3 (H) 01/16/2022    K 4.2 01/16/2022    CO2 25.0 01/16/2022    CALCIUM 10.1 01/16/2022    ALBUMIN 4.20 01/16/2022    LABIL2 1.2 09/11/2020    AST 23 01/16/2022    ALT 24 01/16/2022     No results found for: HGBA1C  Lab Results   Component Value Date    CHOL 182 11/18/2020    CHOL 191 11/21/2019     Lab Results   Component Value Date    HDL 55 11/18/2020    HDL 59 11/21/2019     Lab Results   Component Value Date     (H) 11/18/2020     (H) 11/21/2019     Lab Results   Component Value Date    TRIG 88 11/18/2020    TRIG 52 11/21/2019     Physical Exam:  GENERAL: NAD, overweight  HEENT: Normocephalic, atraumatic   COR: RRR  Resp: Even and unlabored  Extremities: No signs of distal embolization.   Skin: No rashes, lesions or ulcers.  Psychiatric: Normal mood and affect.    Neurological:   MS: AO. Language normal. No neglect. Follows all commands.  CN: II-XII grossly normal except for subtle right-sided facial weakness from previous Bell's Palsy  Motor: Normal strength and tone throughout.  Reflexes: 2+ throughout, toes down-going bilaterally.   Sensory: Intact to light touch in arms and legs  Station and Gait: Normal gait and station.    Coordination: Normal finger to nose bilaterally    ImpressionPlan:     1. Episode of whole body tremors -- unclear etiology, E/D w/u unrevealing and neuro exam is normal today, will check an EEG.   2. Chronic migraines -- Continue on Emgality (samples given today d/t difficulty with insurance), Magnesium and B-complex for prevention. Again reviewed  using Aleve sparingly.     Will have her f/u with Dr. Dasilva at his next available.      I spent a total of 45 minutes today in reviewing records, prior diagnostics, examination of patient as well as counseling and educating patient regarding diagnoses, symptoms, reviewing diagnostics with patient, pharmacologic treatment options including purpose, risk, benefits, possible side-effects, recommendations, lifestyle modifications, coordination of care and documenting plan of care.        Diagnoses and all orders for this visit:    1. Abnormal movements (Primary)  -     EEG; Future        Coding      Dictated using Dragon

## 2022-01-25 ENCOUNTER — OFFICE VISIT (OUTPATIENT)
Dept: FAMILY MEDICINE CLINIC | Facility: CLINIC | Age: 57
End: 2022-01-25

## 2022-01-25 VITALS
WEIGHT: 194 LBS | BODY MASS INDEX: 35.7 KG/M2 | HEART RATE: 99 BPM | RESPIRATION RATE: 14 BRPM | OXYGEN SATURATION: 100 % | HEIGHT: 62 IN | SYSTOLIC BLOOD PRESSURE: 157 MMHG | TEMPERATURE: 98 F | DIASTOLIC BLOOD PRESSURE: 76 MMHG

## 2022-01-25 DIAGNOSIS — Z00.00 ANNUAL PHYSICAL EXAM: Primary | ICD-10-CM

## 2022-01-25 DIAGNOSIS — Z13.220 SCREENING FOR LIPID DISORDERS: ICD-10-CM

## 2022-01-25 DIAGNOSIS — J45.20 MILD INTERMITTENT ASTHMA WITHOUT COMPLICATION: ICD-10-CM

## 2022-01-25 DIAGNOSIS — N20.0 KIDNEY STONE: ICD-10-CM

## 2022-01-25 DIAGNOSIS — Z91.09 ENVIRONMENTAL ALLERGIES: ICD-10-CM

## 2022-01-25 DIAGNOSIS — Z12.31 SCREENING MAMMOGRAM, ENCOUNTER FOR: ICD-10-CM

## 2022-01-25 LAB
BILIRUB BLD-MCNC: NEGATIVE MG/DL
CLARITY, POC: CLEAR
COLOR UR: YELLOW
EXPIRATION DATE: ABNORMAL
GLUCOSE UR STRIP-MCNC: NEGATIVE MG/DL
KETONES UR QL: NEGATIVE
LEUKOCYTE EST, POC: NEGATIVE
Lab: ABNORMAL
NITRITE UR-MCNC: NEGATIVE MG/ML
PH UR: 7 [PH] (ref 5–8)
PROT UR STRIP-MCNC: NEGATIVE MG/DL
RBC # UR STRIP: ABNORMAL /UL
SP GR UR: 1.02 (ref 1–1.03)
UROBILINOGEN UR QL: NORMAL

## 2022-01-25 PROCEDURE — 99396 PREV VISIT EST AGE 40-64: CPT | Performed by: FAMILY MEDICINE

## 2022-01-25 PROCEDURE — 81003 URINALYSIS AUTO W/O SCOPE: CPT | Performed by: FAMILY MEDICINE

## 2022-01-25 RX ORDER — ALBUTEROL SULFATE 90 UG/1
2 AEROSOL, METERED RESPIRATORY (INHALATION) EVERY 6 HOURS PRN
Qty: 18 G | Refills: 0 | Status: SHIPPED | OUTPATIENT
Start: 2022-01-25

## 2022-01-25 RX ORDER — MONTELUKAST SODIUM 10 MG/1
10 TABLET ORAL DAILY
Qty: 90 TABLET | Refills: 3 | Status: SHIPPED | OUTPATIENT
Start: 2022-01-25 | End: 2022-12-28

## 2022-01-25 RX ORDER — EPINEPHRINE 0.3 MG/.3ML
0.3 INJECTION SUBCUTANEOUS ONCE AS NEEDED
Qty: 2 EACH | Refills: 0 | Status: SHIPPED | OUTPATIENT
Start: 2022-01-25

## 2022-01-25 RX ORDER — FLUTICASONE FUROATE 27.5 UG/1
2 SPRAY, METERED NASAL DAILY
Qty: 27.3 ML | Refills: 3 | Status: SHIPPED | OUTPATIENT
Start: 2022-01-25

## 2022-01-27 ENCOUNTER — TELEPHONE (OUTPATIENT)
Dept: NEUROLOGY | Facility: CLINIC | Age: 57
End: 2022-01-27

## 2022-01-27 DIAGNOSIS — G43.109 MIGRAINE WITH AURA, NOT INTRACTABLE, WITHOUT STATUS MIGRAINOSUS: ICD-10-CM

## 2022-01-27 LAB
AGE GDLN ACOG TESTING: NORMAL
CYTOLOGIST CVX/VAG CYTO: NORMAL
CYTOLOGY CVX/VAG DOC CYTO: NORMAL
CYTOLOGY CVX/VAG DOC THIN PREP: NORMAL
DX ICD CODE: NORMAL
HIV 1 & 2 AB SER-IMP: NORMAL
HPV I/H RISK 4 DNA CVX QL PROBE+SIG AMP: NEGATIVE
OTHER STN SPEC: NORMAL
STAT OF ADQ CVX/VAG CYTO-IMP: NORMAL

## 2022-01-27 RX ORDER — GALCANEZUMAB 120 MG/ML
120 INJECTION, SOLUTION SUBCUTANEOUS
Qty: 1 PEN | Refills: 5 | Status: SHIPPED | OUTPATIENT
Start: 2022-01-27 | End: 2022-03-04 | Stop reason: SDUPTHER

## 2022-01-27 NOTE — TELEPHONE ENCOUNTER
----- Message from Goldie Lyons MA sent at 1/27/2022  7:45 AM EST -----  Regarding: FW: Change to [facility]   Can you send to Dr. Dasilva? Thank you.   ----- Message -----  From: Tran Fountain APRN  Sent: 1/26/2022   4:37 PM EST  To: Goldie Lyons MA  Subject: FW: Change to [facility]                    Please send to Dr. Dasilva and for some reason she has an appointment with me scheduled for September that needs to be cancelled. Thanks  ----- Message -----  From: Goldie Lyons MA  Sent: 1/26/2022   8:07 AM EST  To: JILLIAN Soni  Subject: FW: Change to [facility]                    I wasn't sure if you wanted to approve medications or if I should send to Dr. Dasilva. Let me know I'll take care of it either way. Thanks Tran      ----- Message -----  From: Stormy Morrow  Sent: 1/25/2022   4:47 PM EST  To: [facility]  Subject: Change to [facility]                        May I please have my Emgality prescription information sent to [facility] so that they may mail my medication?  Thank you.     Stormy Morrow

## 2022-01-31 ENCOUNTER — TELEPHONE (OUTPATIENT)
Dept: FAMILY MEDICINE CLINIC | Facility: CLINIC | Age: 57
End: 2022-01-31

## 2022-01-31 ENCOUNTER — HOSPITAL ENCOUNTER (OUTPATIENT)
Dept: NEUROLOGY | Facility: HOSPITAL | Age: 57
Discharge: HOME | End: 2022-01-31

## 2022-01-31 DIAGNOSIS — R25.9 ABNORMAL MOVEMENTS: ICD-10-CM

## 2022-01-31 PROCEDURE — 95718 EEG PHYS/QHP 2-12 HR W/VEEG: CPT | Performed by: NURSE PRACTITIONER

## 2022-01-31 RX ORDER — FLUTICASONE FUROATE 27.5 UG/1
2 SPRAY, METERED NASAL DAILY
Qty: 27.3 ML | Refills: 3 | Status: CANCELLED | OUTPATIENT
Start: 2022-01-31

## 2022-01-31 NOTE — TELEPHONE ENCOUNTER
Caller: [facility] - [place], [place] - [address] - [phone] [facility] [phone] FX    Relationship: Pharmacy    Best call back number:[phone]    Requested Prescriptions:   Requested Prescriptions     Pending Prescriptions Disp Refills   • fluticasone (Flonase Sensimist) 27.5 MCG/SPRAY nasal spray 27.3 mL 3     Si sprays into the nostril(s) as directed by provider Daily.        Pharmacy where request should be sent: [facility] - [place], [place] - [address] - [phone] [facility] [phone] FX  [place], [place] - [address] [phone] [place] [phone] FX  [facility] [address] [place] [address] - [address] [place] [address] - [phone] [place] [phone] FX     Additional details provided by patient: PATIENTS NEEDS THE GENERIC OF FLONASE SENSIMIST. INSURANCE WILL NOT COVER THE NAME BRAND    Does the patient have less than a 3 day supply:  [x] Yes  [] No    Florence Hopkins, Aashished Rep   22 14:22 EST

## 2022-01-31 NOTE — TELEPHONE ENCOUNTER
Rx Refill Note  Requested Prescriptions     Pending Prescriptions Disp Refills   • fluticasone (Flonase Sensimist) 27.5 MCG/SPRAY nasal spray 27.3 mL 3     Si sprays into the nostril(s) as directed by provider Daily.      Last office visit with prescribing clinician: 2022      Next office visit with prescribing clinician: Visit date not found     CBC & Differential (2022 03:56)  Comprehensive Metabolic Panel (2022 03:56)         Bushra Arrington CMA  22, 15:08 EST

## 2022-02-09 ENCOUNTER — TELEPHONE (OUTPATIENT)
Dept: NEUROLOGY | Facility: CLINIC | Age: 57
End: 2022-02-09

## 2022-02-23 ENCOUNTER — TELEPHONE (OUTPATIENT)
Dept: NEUROLOGY | Facility: CLINIC | Age: 57
End: 2022-02-23

## 2022-02-23 NOTE — TELEPHONE ENCOUNTER
Provider: DR. AGEE   Caller: MARIANA   Relationship to Patient: PT  Phone Number: [phone]  Reason for Call: PT STATES DR. AGEE CALLED HER ABOUT HER EEG RESULTS, PLEASE CALL PT BACK WHEN AVAILABLE.

## 2022-02-28 ENCOUNTER — TELEPHONE (OUTPATIENT)
Dept: NEUROLOGY | Facility: CLINIC | Age: 57
End: 2022-02-28

## 2022-02-28 DIAGNOSIS — R94.01 ABNORMAL EEG: Primary | ICD-10-CM

## 2022-02-28 DIAGNOSIS — R56.9 SEIZURE-LIKE ACTIVITY: ICD-10-CM

## 2022-02-28 RX ORDER — LEVETIRACETAM 250 MG/1
250 TABLET ORAL 2 TIMES DAILY
Qty: 60 TABLET | Refills: 0 | Status: SHIPPED | OUTPATIENT
Start: 2022-02-28 | End: 2022-03-18

## 2022-03-01 NOTE — TELEPHONE ENCOUNTER
Tried calling pt, went straight to , and  is not set up- sending pt a [employer] message stating that a prior auth is needed, came back denied, however an appeal has been placed on 2/25. Once we know more we will reach out to patient.

## 2022-03-04 ENCOUNTER — TELEPHONE (OUTPATIENT)
Dept: NEUROLOGY | Facility: CLINIC | Age: 57
End: 2022-03-04

## 2022-03-04 DIAGNOSIS — G43.109 MIGRAINE WITH AURA, NOT INTRACTABLE, WITHOUT STATUS MIGRAINOSUS: ICD-10-CM

## 2022-03-04 RX ORDER — GALCANEZUMAB 120 MG/ML
120 INJECTION, SOLUTION SUBCUTANEOUS
Qty: 3 PEN | Refills: 1 | Status: SHIPPED | OUTPATIENT
Start: 2022-03-04 | End: 2022-08-10

## 2022-03-04 NOTE — TELEPHONE ENCOUNTER
----- Message from Stormy Morrow sent at 3/4/2022 12:19 PM EST -----  Regarding: Previous message addendum   If possible could you please send the Emgality prescription as 90 day? Thank you.

## 2022-03-17 DIAGNOSIS — R56.9 SEIZURE-LIKE ACTIVITY: ICD-10-CM

## 2022-03-17 DIAGNOSIS — R94.01 ABNORMAL EEG: ICD-10-CM

## 2022-03-18 RX ORDER — LEVETIRACETAM 250 MG/1
TABLET ORAL
Qty: 60 TABLET | Refills: 11 | Status: SHIPPED | OUTPATIENT
Start: 2022-03-18 | End: 2022-05-17

## 2022-03-18 RX ORDER — ALBUTEROL SULFATE 90 UG/1
AEROSOL, METERED RESPIRATORY (INHALATION)
Qty: 17 G | Refills: 4 | OUTPATIENT
Start: 2022-03-18

## 2022-03-18 NOTE — TELEPHONE ENCOUNTER
Rx Refill Note  Requested Prescriptions     Pending Prescriptions Disp Refills   • albuterol sulfate  (90 Base) MCG/ACT inhaler [Pharmacy Med Name: ALBUTEROL HFA INHALER 8.5GM 90MCG] 17 g 4     Sig: USE 2 INHALATIONS EVERY 6 HOURS AS NEEDED FOR WHEEZING OR SHORTNESS OF AIR      Last office visit with prescribing clinician: 1/25/2022      Next office visit with prescribing clinician: Visit date not found     CBC & Differential (01/16/2022 03:56)  Comprehensive Metabolic Panel (01/16/2022 03:56)  Lipid Panel (11/18/2020 08:23)         Bushra Arrington, BISI  03/18/22, 08:44 EDT

## 2022-04-20 ENCOUNTER — OFFICE VISIT (OUTPATIENT)
Dept: NEUROLOGY | Facility: CLINIC | Age: 57
End: 2022-04-20

## 2022-04-20 VITALS
DIASTOLIC BLOOD PRESSURE: 92 MMHG | HEIGHT: 64 IN | HEART RATE: 91 BPM | OXYGEN SATURATION: 99 % | WEIGHT: 186 LBS | BODY MASS INDEX: 31.76 KG/M2 | SYSTOLIC BLOOD PRESSURE: 138 MMHG

## 2022-04-20 DIAGNOSIS — G43.109 MIGRAINE WITH AURA, NOT INTRACTABLE, WITHOUT STATUS MIGRAINOSUS: ICD-10-CM

## 2022-04-20 DIAGNOSIS — R56.9 SEIZURE-LIKE ACTIVITY: ICD-10-CM

## 2022-04-20 DIAGNOSIS — R94.01 ABNORMAL EEG: Primary | ICD-10-CM

## 2022-04-20 PROBLEM — Z88.9 H/O MULTIPLE ALLERGIES: Status: ACTIVE | Noted: 2022-03-06

## 2022-04-20 PROBLEM — Z91.09 ENVIRONMENTAL ALLERGIES: Status: ACTIVE | Noted: 2022-03-06

## 2022-04-20 PROCEDURE — 99214 OFFICE O/P EST MOD 30 MIN: CPT | Performed by: PSYCHIATRY & NEUROLOGY

## 2022-04-20 RX ORDER — POTASSIUM CHLORIDE 750 MG/1
10 TABLET, EXTENDED RELEASE ORAL DAILY
COMMUNITY
Start: 2022-04-11

## 2022-04-20 RX ORDER — LEVETIRACETAM 250 MG/1
250 TABLET ORAL 2 TIMES DAILY
COMMUNITY
Start: 2022-02-28 | End: 2023-03-02

## 2022-04-20 NOTE — PROGRESS NOTES
Chief Complaint   Patient presents with   • Migraine   • Seizures       Patient ID: Stormy Morrow is a 56 y.o. female.    HPI: I have had the pleasure of seeing your patient again today.  As you may know she is a 56-year-old female here for the management of migraine headaches and seizure x1.  She did have an abnormal EEG with left temporal sharp waves.  She is now on Keppra 250 mg twice daily.  She has tolerated it well.  No significant side effects.  She has had 3 days of headaches within the last 30 days.  She is taking Emgality injections q. monthly along with magnesium.  She will use naproxen as abortive therapy which is quite helpful.    The following portions of the patient's history were reviewed and updated as appropriate: allergies, current medications, past family history, past medical history, past social history, past surgical history and problem list.    Review of Systems   Constitutional: Negative for activity change, appetite change, chills and fatigue.   Eyes: Positive for pain and visual disturbance.   Respiratory: Positive for shortness of breath and wheezing. Negative for cough.    Cardiovascular: Negative for chest pain, palpitations and leg swelling.   Gastrointestinal: Negative for nausea and vomiting.   Endocrine: Positive for cold intolerance. Negative for heat intolerance.   Musculoskeletal: Positive for neck pain. Negative for back pain.   Neurological: Positive for tremors, weakness, light-headedness, numbness and headaches. Negative for seizures, syncope and speech difficulty.   Psychiatric/Behavioral: Positive for agitation, confusion, decreased concentration and sleep disturbance. Negative for behavioral problems, self-injury and suicidal ideas. The patient is not nervous/anxious.       I have reviewed the review of systems above performed by my medical assistant.      Vitals:    04/20/22 1003   BP: 138/92   Pulse: 91   SpO2: 99%       Neurologic Exam     Mental Status   Oriented  to person, place, and time.   Concentration: normal.   Level of consciousness: alert  Knowledge: consistent with education (No deficits found.).     Cranial Nerves     CN II   Visual fields full to confrontation.     CN III, IV, VI   Pupils are equal, round, and reactive to light.  Extraocular motions are normal.   CN III: no CN III palsy  CN VI: no CN VI palsy    CN V   Facial sensation intact.     CN VII   Facial expression full, symmetric.     CN VIII   CN VIII normal.     CN IX, X   CN IX normal.   CN X normal.     CN XI   CN XI normal.     CN XII   CN XII normal.     Motor Exam     Strength   Right neck flexion: 5/5  Left neck flexion: 5/5  Right neck extension: 5/5  Left neck extension: 5/5  Right deltoid: 5/5  Left deltoid: 5/5  Right biceps: 5/5  Left biceps: 5/5  Right triceps: 5/5  Left triceps: 5/5  Right wrist flexion: 5/5  Left wrist flexion: 5/5  Right wrist extension: 5/5  Left wrist extension: 5/5  Right interossei: 5/5  Left interossei: 5/5  Right abdominals: 5/5  Left abdominals: 5/5  Right iliopsoas: 5/5  Left iliopsoas: 5/5  Right quadriceps: 5/5  Left quadriceps: 5/5  Right hamstrin/5  Left hamstrin/5  Right glutei: 5/5  Left glutei: 5/5  Right anterior tibial: 5/5  Left anterior tibial: 5/5  Right posterior tibial: 5/5  Left posterior tibial: 5/5  Right peroneal: 5/5  Left peroneal: 5/5  Right gastroc: 5/5  Left gastroc: 5/5    Sensory Exam   Light touch normal.   Vibration normal.     Gait, Coordination, and Reflexes     Gait  Gait: normal    Reflexes   Right brachioradialis: 2+  Left brachioradialis: 2+  Right biceps: 2+  Left biceps: 2+  Right triceps: 2+  Left triceps: 2+  Right patellar: 2+  Left patellar: 2+  Right achilles: 2+  Left achilles: 2+  Right : 2+  Left : 2+Station is normal.       Physical Exam  Vitals reviewed.   Constitutional:       Appearance: She is well-developed.   HENT:      Head: Normocephalic and atraumatic.   Eyes:      Extraocular Movements: EOM  normal.      Pupils: Pupils are equal, round, and reactive to light.   Cardiovascular:      Rate and Rhythm: Normal rate and regular rhythm.   Pulmonary:      Breath sounds: Normal breath sounds.   Musculoskeletal:         General: Normal range of motion.   Skin:     General: Skin is warm.   Neurological:      Mental Status: She is oriented to person, place, and time.      Gait: Gait is intact.      Deep Tendon Reflexes:      Reflex Scores:       Tricep reflexes are 2+ on the right side and 2+ on the left side.       Bicep reflexes are 2+ on the right side and 2+ on the left side.       Brachioradialis reflexes are 2+ on the right side and 2+ on the left side.       Patellar reflexes are 2+ on the right side and 2+ on the left side.       Achilles reflexes are 2+ on the right side and 2+ on the left side.        Procedures    Assessment/Plan: We are going to continue with the Keppra as scheduled.  I would like to schedule an MRI of her brain given the new onset seizure and abnormal EEG.  We will also continue with the Emgality and naproxen.  Return to clinic in 4 months or sooner if needed.  A total of 30 minutes was spent face-to-face with patient today.  Of that greater than 50% of this time was spent discussing signs and symptoms of seizures, patient education, plan of care and prognosis.       Diagnoses and all orders for this visit:    1. Abnormal EEG (Primary)  -     MRI Brain With & Without Contrast; Future    2. Seizure-like activity (HCC)  -     MRI Brain With & Without Contrast; Future    3. Migraine with aura, not intractable, without status migrainosus  -     MRI Brain With & Without Contrast; Future           Chuy Dasilva II, MD

## 2022-05-13 NOTE — TELEPHONE ENCOUNTER
LVM for pt to call office, Appt on 4/8 will need to be rescheduled due to provider out of the office.   
Cell Phone/PDA (specify)/Clothing

## 2022-05-17 ENCOUNTER — NURSE TRIAGE (OUTPATIENT)
Dept: CALL CENTER | Facility: HOSPITAL | Age: 57
End: 2022-05-17

## 2022-05-17 ENCOUNTER — OFFICE VISIT (OUTPATIENT)
Dept: FAMILY MEDICINE CLINIC | Facility: CLINIC | Age: 57
End: 2022-05-17

## 2022-05-17 VITALS
HEIGHT: 64 IN | WEIGHT: 186 LBS | OXYGEN SATURATION: 99 % | TEMPERATURE: 98.6 F | HEART RATE: 92 BPM | DIASTOLIC BLOOD PRESSURE: 82 MMHG | RESPIRATION RATE: 16 BRPM | SYSTOLIC BLOOD PRESSURE: 137 MMHG | BODY MASS INDEX: 31.76 KG/M2

## 2022-05-17 DIAGNOSIS — R05.9 COUGH IN ADULT: ICD-10-CM

## 2022-05-17 DIAGNOSIS — J45.41 MODERATE PERSISTENT ASTHMA WITH ACUTE EXACERBATION: Primary | ICD-10-CM

## 2022-05-17 PROCEDURE — 99213 OFFICE O/P EST LOW 20 MIN: CPT | Performed by: FAMILY MEDICINE

## 2022-05-17 RX ORDER — BENZONATATE 200 MG/1
200 CAPSULE ORAL 3 TIMES DAILY PRN
Qty: 60 CAPSULE | Refills: 2 | Status: SHIPPED | OUTPATIENT
Start: 2022-05-17 | End: 2022-12-19

## 2022-05-17 NOTE — TELEPHONE ENCOUNTER
"HUB. Coughing is worse today feels like has bronchitis, has asthma, using her inhaler on regular basis,  since had covid few weeks. Told her needs to be seen today if cannot get into [facility] then go to , Soft transfer to office and can be seen at 11:00 today. Pt. In no distress at this time, but is coughing a lot. And has some congestion.     Reason for Disposition  • [1] MILD difficulty breathing (e.g., minimal/no SOB at rest, SOB with walking, pulse <100) AND [2] NEW-onset or WORSE than normal    Additional Information  • Negative: SEVERE difficulty breathing (e.g., struggling for each breath, speaks in single words)  • Negative: [1] Breathing stopped AND [2] hasn't returned  • Negative: Choking on something  • Negative: Bluish (or gray) lips or face now  • Negative: Difficult to awaken or acting confused (e.g., disoriented, slurred speech)  • Negative: Passed out (i.e., lost consciousness, collapsed and was not responding)  • Negative: Wheezing started suddenly after medicine, an allergic food or bee sting  • Negative: Stridor  • Negative: Slow, shallow and weak breathing  • Negative: Sounds like a life-threatening emergency to the triager  • Negative: Chest pain  • Negative: [1] Wheezing (high pitched whistling sound) AND [2] previous asthma attacks or use of asthma medicines  • Negative: [1] Difficulty breathing AND [2] only present when coughing  • Negative: [1] Difficulty breathing AND [2] only from stuffy or runny nose  • Negative: [1] Difficulty breathing AND [2] within 14 days of COVID-19 Exposure  • Negative: [1] MODERATE difficulty breathing (e.g., speaks in phrases, SOB even at rest, pulse 100-120) AND [2] NEW-onset or WORSE than normal  • Negative: Wheezing can be heard across the room  • Negative: Drooling or spitting out saliva (because can't swallow)  • Negative: History of prior \"blood clot\" in leg or lungs (i.e., deep vein thrombosis, pulmonary embolism)  • Negative: History of inherited " "increased risk of blood clots (e.g., Factor 5 Leiden, Anti-thrombin 3, Protein C or Protein S deficiency, Prothrombin mutation)  • Negative: Major surgery in the past month  • Negative: Hip or leg fracture (broken bone) in past month (or had cast on leg or ankle in past month)  • Negative: Illness requiring prolonged bedrest in past month (e.g., immobilization, long hospital stay)  • Negative: Long-distance travel in past month (e.g., car, bus, train, plane; with trip lasting 6 or more hours)  • Negative: Cancer treatment in past six months (or has cancer now)  • Negative: Extra heart beats OR irregular heart beating   (i.e., \"palpitations\")  • Negative: Fever > 103 F (39.4 C)  • Negative: [1] Fever > 101 F (38.3 C) AND [2] age > 60 years  • Negative: [1] Fever > 100.0 F (37.8 C) AND [2] bedridden (e.g., nursing home patient, CVA, chronic illness, recovering from surgery)  • Negative: [1] Fever > 100.0 F (37.8 C) AND [2] diabetes mellitus or weak immune system (e.g., HIV positive, cancer chemo, splenectomy, organ transplant, chronic steroids)  • Negative: [1] Periods where breathing stops and then resumes normally AND [2] bedridden (e.g., nursing home patient, CVA)  • Negative: Pregnant or postpartum (from 0 to 6 weeks after delivery)  • Negative: Patient sounds very sick or weak to the triager    Answer Assessment - Initial Assessment Questions  1. RESPIRATORY STATUS: \"Describe your breathing?\" (e.g., wheezing, shortness of breath, unable to speak, severe coughing)       Sob, coughing alot  2. ONSET: \"When did this breathing problem begin?\"       Last night  3. PATTERN \"Does the difficult breathing come and go, or has it been constant since it started?\"       Comes and goes  4. SEVERITY: \"How bad is your breathing?\" (e.g., mild, moderate, severe)     - MILD: No SOB at rest, mild SOB with walking, speaks normally in sentences, can lay down, no retractions, pulse < 100.     - MODERATE: SOB at rest, SOB with minimal " "exertion and prefers to sit, cannot lie down flat, speaks in phrases, mild retractions, audible wheezing, pulse 100-120.     - SEVERE: Very SOB at rest, speaks in single words, struggling to breathe, sitting hunched forward, retractions, pulse > 120       moderate  5. RECURRENT SYMPTOM: \"Have you had difficulty breathing before?\" If Yes, ask: \"When was the last time?\" and \"What happened that time?\"       Has had this before  6. CARDIAC HISTORY: \"Do you have any history of heart disease?\" (e.g., heart attack, angina, bypass surgery, angioplasty)       no  7. LUNG HISTORY: \"Do you have any history of lung disease?\"  (e.g., pulmonary embolus, asthma, emphysema)      asthma  8. CAUSE: \"What do you think is causing the breathing problem?\"       Maybe asthmatic bronchitis  9. OTHER SYMPTOMS: \"Do you have any other symptoms? (e.g., dizziness, runny nose, cough, chest pain, fever)      cough  10. PREGNANCY: \"Is there any chance you are pregnant?\" \"When was your last menstrual period?\"        no  11. TRAVEL: \"Have you traveled out of the country in the last month?\" (e.g., travel history, exposures)        no    Protocols used: BREATHING DIFFICULTY-ADULT-AH      "

## 2022-05-17 NOTE — PROGRESS NOTES
Subjective   Stormy Morrow is a 56 y.o. female.     Chief Complaint   Patient presents with   • Cough     Patient still has a cough. Patient tested positive for COVID on 5/4/22.Patient states that she has been using her inhaler everyday.          Current Outpatient Medications:   •  albuterol sulfate  (90 Base) MCG/ACT inhaler, Inhale 2 puffs Every 6 (Six) Hours As Needed for Wheezing or Shortness of Air., Disp: 18 g, Rfl: 0  •  Cetirizine HCl 10 MG capsule, Take 1 capsule by mouth Daily., Disp: 90 capsule, Rfl: 3  •  chlorthalidone (HYGROTON) 25 MG tablet, Take 12.5 mg by mouth Daily., Disp: , Rfl:   •  EPINEPHrine (EPIPEN) 0.3 MG/0.3ML solution auto-injector injection, Inject 0.3 mL into the appropriate muscle as directed by prescriber 1 (One) Time As Needed (swelling/ corona) for up to 1 dose., Disp: 2 each, Rfl: 0  •  fluticasone (Flonase Sensimist) 27.5 MCG/SPRAY nasal spray, 2 sprays into the nostril(s) as directed by provider Daily., Disp: 27.3 mL, Rfl: 3  •  galcanezumab-gnlm (Emgality) 120 MG/ML auto-injector pen, Inject 1 mL under the skin into the appropriate area as directed Every 30 (Thirty) Days., Disp: 3 pen, Rfl: 1  •  levETIRAcetam (KEPPRA) 250 MG tablet, Take 250 mg by mouth 2 (Two) Times a Day., Disp: , Rfl:   •  Magnesium 100 MG capsule, 1 po qd, Disp: , Rfl:   •  montelukast (SINGULAIR) 10 MG tablet, Take 1 tablet by mouth Daily., Disp: 90 tablet, Rfl: 3  •  Nutritional Supplements (THERALITH XR) tablet, Take 2 tablets by mouth 2 (Two) Times a Day., Disp: , Rfl:   •  potassium chloride (K-DUR,KLOR-CON) 10 MEQ CR tablet, Take 10 mEq by mouth Daily., Disp: , Rfl:   •  benzonatate (TESSALON) 200 MG capsule, Take 1 capsule by mouth 3 (Three) Times a Day As Needed for Cough., Disp: 60 capsule, Rfl: 2  •  Fluticasone-Umeclidin-Vilant (Trelegy Ellipta) 100-62.5-25 MCG/INH inhaler, Inhale 1 puff Daily., Disp: 60 each, Rfl: 0    Past Medical History:   Diagnosis Date   • Allergic    • Asthma  11/21/2019   • Bell palsy     Rt Side   • Heart murmur    • Hypertension Octavio    Possibly prehypertensive   • Kidney stone    • MAMMO     NEG = 2018/ 2019/ 2021   • Migraine    • PAP     NEG = 2018/ 2019/ 2022   • Raynaud phenomenon        Past Surgical History:   Procedure Laterality Date   • APPENDECTOMY  07/2000   • COLONOSCOPY      Cologuard ----2019= NEG, rech 2022   • CYSTOSCOPY BLADDER STONE LITHOTRIPSY  2019/ 2020    x 2       Family History   Problem Relation Age of Onset   • Arthritis Mother    • Osteoporosis Mother    • Stroke Mother    • Hypertension Mother    • Miscarriages / Stillbirths Mother         Miscarriage   • Arthritis Father    • Mental illness Father         Ptsd combat  medic   • Hyperlipidemia Father    • Kidney disease Father         Renal tubular acidosis & kidney stones   • Cancer Father    • Obesity Father    • Hypertension Father    • Kidney nephrosis Father    • Hypertension Brother    • Kidney nephrosis Brother    • Migraines Daughter    • Depression Daughter    • Hyperlipidemia Son    • Kidney disease Son         Kidney stones   • Migraines Maternal Aunt    • Hyperlipidemia Maternal Uncle    • Heart attack Maternal Uncle    • Arthritis Maternal Grandmother    • Cancer Maternal Grandmother    • Obesity Maternal Grandmother    • Breast cancer Maternal Grandmother    • Cancer Maternal Grandfather    • Stroke Paternal Grandfather         Age 76       Social History     Socioeconomic History   • Marital status:    Tobacco Use   • Smoking status: Never Smoker   • Smokeless tobacco: Never Used   • Tobacco comment: Second hand smoke parents   Vaping Use   • Vaping Use: Never used   Substance and Sexual Activity   • Alcohol use: Never   • Drug use: Never   • Sexual activity: Yes     Partners: Male     Birth control/protection: Post-menopausal       57y/o C female here w/ c/o's persistent cough x 2days    Pt tried her HFA w/ some relief and then took antihist and cough syrup to  help her sleep  Pt had +covid on 5/4 and tx'd w/ the oral Paxlovid and tessalon perles by  ----states this seemed to help until cough started again 2 days ago  Clear phlegm w/ PND/ no Fever       The following portions of the patient's history were reviewed and updated as appropriate: allergies, current medications, past family history, past medical history, past social history, past surgical history and problem list.    Review of Systems   Constitutional: Negative for activity change, appetite change, chills, fatigue, fever, unexpected weight gain and unexpected weight loss.   HENT: Positive for postnasal drip. Negative for congestion, ear discharge, ear pain, rhinorrhea, sinus pressure, sneezing, sore throat and swollen glands.    Eyes: Negative for pain, discharge, redness, itching and visual disturbance.   Respiratory: Positive for cough. Negative for shortness of breath, wheezing and stridor.    Skin: Negative for rash.   Allergic/Immunologic: Negative for environmental allergies.   Psychiatric/Behavioral: Negative for sleep disturbance.       Vitals:    05/17/22 1355   BP: 137/82   Pulse: 92   Resp: 16   Temp: 98.6 °F (37 °C)   SpO2: 99%       Objective   Physical Exam  Vitals and nursing note reviewed.   Constitutional:       General: She is not in acute distress.     Appearance: She is well-developed and normal weight. She is not ill-appearing, toxic-appearing or diaphoretic.   HENT:      Head: Normocephalic and atraumatic.      Right Ear: Tympanic membrane, ear canal and external ear normal. There is no impacted cerumen.      Left Ear: Tympanic membrane, ear canal and external ear normal. There is no impacted cerumen.      Nose: Rhinorrhea present. No congestion. Rhinorrhea is clear.      Right Turbinates: Not swollen or pale.      Left Turbinates: Not swollen or pale.      Comments: Erythematous nasal mucosa b/l     Mouth/Throat:      Mouth: Mucous membranes are moist.      Pharynx: Oropharynx is clear.  No oropharyngeal exudate or posterior oropharyngeal erythema.   Eyes:      General: No scleral icterus.        Right eye: No discharge.         Left eye: No discharge.      Extraocular Movements: Extraocular movements intact.      Conjunctiva/sclera: Conjunctivae normal.      Pupils: Pupils are equal, round, and reactive to light.   Neck:      Thyroid: No thyromegaly.   Cardiovascular:      Rate and Rhythm: Normal rate and regular rhythm.      Heart sounds: Normal heart sounds. No murmur heard.  Pulmonary:      Effort: Pulmonary effort is normal. No respiratory distress.      Breath sounds: Normal breath sounds. No stridor. No wheezing, rhonchi or rales.   Musculoskeletal:      Cervical back: Normal range of motion and neck supple.   Lymphadenopathy:      Cervical: No cervical adenopathy.   Skin:     General: Skin is warm and dry.      Coloration: Skin is not pale.      Findings: No erythema or rash.   Neurological:      Mental Status: She is alert and oriented to person, place, and time.      Cranial Nerves: No cranial nerve deficit.   Psychiatric:         Attention and Perception: Attention normal.         Mood and Affect: Mood and affect normal.         Speech: Speech normal.         Behavior: Behavior normal. Behavior is cooperative.         Thought Content: Thought content normal.         Cognition and Memory: Cognition and memory normal.         Judgment: Judgment normal.           Assessment & Plan   Diagnoses and all orders for this visit:    1. Moderate persistent asthma with acute exacerbation (Primary)    Other orders  -     Fluticasone-Umeclidin-Vilant (Trelegy Ellipta) 100-62.5-25 MCG/INH inhaler; Inhale 1 puff Daily.  Dispense: 60 each; Refill: 0  -     benzonatate (TESSALON) 200 MG capsule; Take 1 capsule by mouth 3 (Three) Times a Day As Needed for Cough.  Dispense: 60 capsule; Refill: 2    Reviewed recent  visit  Rx---Tessalon perles tid prn  Will try (sample) trelegy 100 due to s/e from oral  pred  Offered poss nebulizer Rx in future if she wants

## 2022-05-26 ENCOUNTER — HOSPITAL ENCOUNTER (OUTPATIENT)
Dept: MAMMOGRAPHY | Facility: HOSPITAL | Age: 57
Discharge: HOME | End: 2022-05-26
Admitting: FAMILY MEDICINE

## 2022-05-26 DIAGNOSIS — Z12.31 SCREENING MAMMOGRAM, ENCOUNTER FOR: ICD-10-CM

## 2022-05-26 PROCEDURE — 77063 BREAST TOMOSYNTHESIS BI: CPT

## 2022-05-26 PROCEDURE — 77067 SCR MAMMO BI INCL CAD: CPT

## 2022-05-28 ENCOUNTER — HOSPITAL ENCOUNTER (OUTPATIENT)
Dept: MRI IMAGING | Facility: HOSPITAL | Age: 57
Discharge: HOME | End: 2022-05-28
Admitting: PSYCHIATRY & NEUROLOGY

## 2022-05-28 DIAGNOSIS — R56.9 SEIZURE-LIKE ACTIVITY: ICD-10-CM

## 2022-05-28 DIAGNOSIS — R94.01 ABNORMAL EEG: ICD-10-CM

## 2022-05-28 DIAGNOSIS — G43.109 MIGRAINE WITH AURA, NOT INTRACTABLE, WITHOUT STATUS MIGRAINOSUS: ICD-10-CM

## 2022-05-28 PROCEDURE — A9579 GAD-BASE MR CONTRAST NOS,1ML: HCPCS | Performed by: PSYCHIATRY & NEUROLOGY

## 2022-05-28 PROCEDURE — 25010000002 GADOTERIDOL PER 1 ML: Performed by: PSYCHIATRY & NEUROLOGY

## 2022-05-28 PROCEDURE — 70553 MRI BRAIN STEM W/O & W/DYE: CPT

## 2022-05-28 RX ADMIN — GADOTERIDOL 20 ML: 279.3 INJECTION, SOLUTION INTRAVENOUS at 13:16

## 2022-08-09 DIAGNOSIS — G43.109 MIGRAINE WITH AURA, NOT INTRACTABLE, WITHOUT STATUS MIGRAINOSUS: ICD-10-CM

## 2022-08-10 RX ORDER — GALCANEZUMAB 120 MG/ML
INJECTION, SOLUTION SUBCUTANEOUS
Qty: 3 ML | Refills: 3 | Status: SHIPPED | OUTPATIENT
Start: 2022-08-10

## 2022-08-19 ENCOUNTER — OFFICE VISIT (OUTPATIENT)
Dept: NEUROLOGY | Facility: CLINIC | Age: 57
End: 2022-08-19

## 2022-08-19 VITALS
DIASTOLIC BLOOD PRESSURE: 82 MMHG | HEIGHT: 64 IN | SYSTOLIC BLOOD PRESSURE: 134 MMHG | OXYGEN SATURATION: 96 % | HEART RATE: 103 BPM | WEIGHT: 180 LBS | BODY MASS INDEX: 30.73 KG/M2

## 2022-08-19 DIAGNOSIS — G43.109 MIGRAINE WITH AURA, NOT INTRACTABLE, WITHOUT STATUS MIGRAINOSUS: Primary | ICD-10-CM

## 2022-08-19 DIAGNOSIS — R94.01 ABNORMAL EEG: ICD-10-CM

## 2022-08-19 DIAGNOSIS — S09.90XA INJURY OF HEAD, INITIAL ENCOUNTER: ICD-10-CM

## 2022-08-19 DIAGNOSIS — R56.9 SEIZURE-LIKE ACTIVITY: ICD-10-CM

## 2022-08-19 PROCEDURE — 99213 OFFICE O/P EST LOW 20 MIN: CPT | Performed by: PSYCHIATRY & NEUROLOGY

## 2022-08-19 RX ORDER — ONDANSETRON 4 MG/1
TABLET, FILM COATED ORAL
COMMUNITY
Start: 2022-08-15 | End: 2022-12-19

## 2022-08-19 RX ORDER — PHENAZOPYRIDINE HYDROCHLORIDE 200 MG/1
TABLET, FILM COATED ORAL
COMMUNITY
Start: 2022-08-15 | End: 2022-12-19

## 2022-08-19 RX ORDER — OXYCODONE AND ACETAMINOPHEN 7.5; 325 MG/1; MG/1
1-2 TABLET ORAL
COMMUNITY
Start: 2022-08-15 | End: 2022-12-19

## 2022-08-19 RX ORDER — CEPHALEXIN 500 MG/1
CAPSULE ORAL
COMMUNITY
Start: 2022-08-15 | End: 2022-12-19

## 2022-08-19 NOTE — PROGRESS NOTES
Chief Complaint   Patient presents with   • Migraine   • seizure like activity        Patient ID: Stormy Morrow is a 57 y.o. female.    HPI: I had the pleasure of seeing your patient again today.  As you may know she is a 57-year-old female here for management of migraine headache.  She also has a history of abnormal EEG and seizure-like activity.  She is on Keppra 250 mg twice daily.  She is tolerating that dose well.  No side effects.  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  She did not experience any loss of consciousness.  She may have had some headache and blurry vision immediately afterwards.  No prolonged dizziness.  No current visual change.  No change of concentration or cognitive status.  With respect to migraine she seems to be doing well.  She has had 6 days of headaches within the last 30 days.  She will typically use naproxen as needed abortively.  She also is taking Emgality injections q. monthly.    The following portions of the patient's history were reviewed and updated as appropriate: allergies, current medications, past family history, past medical history, past social history, past surgical history and problem list.    Review of Systems   Genitourinary: Positive for frequency.   Neurological: Positive for weakness and headaches. Negative for dizziness, tremors, seizures, syncope, speech difficulty, light-headedness and numbness.   Hematological: Does not bruise/bleed easily.   Psychiatric/Behavioral: Positive for decreased concentration and sleep disturbance. Negative for agitation, behavioral problems, confusion, hallucinations, self-injury and suicidal ideas. The patient is not nervous/anxious.       I have reviewed the review of systems above performed by my medical assistant.      Vitals:    08/19/22 0952   BP: 134/82   Pulse: 103   SpO2: 96%       Neurologic Exam     Mental Status   Oriented to person, place, and time.   Concentration: normal.   Level  of consciousness: alert  Knowledge: consistent with education (No deficits found.).     Cranial Nerves     CN II   Visual fields full to confrontation.     CN III, IV, VI   Pupils are equal, round, and reactive to light.  Extraocular motions are normal.   CN III: no CN III palsy  CN VI: no CN VI palsy    CN V   Facial sensation intact.     CN VII   Facial expression full, symmetric.     CN VIII   CN VIII normal.     CN IX, X   CN IX normal.   CN X normal.     CN XI   CN XI normal.     CN XII   CN XII normal.     Motor Exam     Strength   Right neck flexion: 5/5  Left neck flexion: 5/5  Right neck extension: 5/5  Left neck extension: 5/5  Right deltoid: 5/5  Left deltoid: 5/5  Right biceps: 5/5  Left biceps: 5/5  Right triceps: 5/5  Left triceps: 5/5  Right wrist flexion: 5/5  Left wrist flexion: 5/5  Right wrist extension: 5/5  Left wrist extension: 5/5  Right interossei: 5/5  Left interossei: 5/5  Right abdominals: 5/5  Left abdominals: 5/5  Right iliopsoas: 5/5  Left iliopsoas: 5/5  Right quadriceps: 5/5  Left quadriceps: 5/5  Right hamstrin/5  Left hamstrin/5  Right glutei: 5/5  Left glutei: 5/5  Right anterior tibial: 5/5  Left anterior tibial: 5/5  Right posterior tibial: 5/5  Left posterior tibial: 5/5  Right peroneal: 5/5  Left peroneal: 5/5  Right gastroc: 5/5  Left gastroc: 5/5    Sensory Exam   Light touch normal.   Vibration normal.     Gait, Coordination, and Reflexes     Gait  Gait: normal    Reflexes   Right brachioradialis: 2+  Left brachioradialis: 2+  Right biceps: 2+  Left biceps: 2+  Right triceps: 2+  Left triceps: 2+  Right patellar: 2+  Left patellar: 2+  Right achilles: 2+  Left achilles: 2+  Right : 2+  Left : 2+Station is normal.       Physical Exam  Vitals reviewed.   Constitutional:       Appearance: She is well-developed.   HENT:      Head: Normocephalic and atraumatic.   Eyes:      Extraocular Movements: EOM normal.      Pupils: Pupils are equal, round, and reactive to  light.   Cardiovascular:      Rate and Rhythm: Normal rate and regular rhythm.   Pulmonary:      Breath sounds: Normal breath sounds.   Musculoskeletal:         General: Normal range of motion.   Skin:     General: Skin is warm.   Neurological:      Mental Status: She is oriented to person, place, and time.      Gait: Gait is intact.      Deep Tendon Reflexes:      Reflex Scores:       Tricep reflexes are 2+ on the right side and 2+ on the left side.       Bicep reflexes are 2+ on the right side and 2+ on the left side.       Brachioradialis reflexes are 2+ on the right side and 2+ on the left side.       Patellar reflexes are 2+ on the right side and 2+ on the left side.       Achilles reflexes are 2+ on the right side and 2+ on the left side.        Procedures    Assessment/Plan: We are going to continue the current dose of Keppra for her.  Continue Emgality injections as well as the naproxen.  Continue magnesium for now.  We will see her back in 4 months or sooner if needed.  A total of 25 minutes was spent face-to-face with patient today.  Of that greater than 50% of this time was spent discussing signs and symptoms of migraine headaches, head injury, seizures, patient education, plan of care and prognosis.       Diagnoses and all orders for this visit:    1. Migraine with aura, not intractable, without status migrainosus (Primary)    2. Abnormal EEG    3. Seizure-like activity (HCC)    4. Injury of head, initial encounter           Chuy Dasilva II, MD

## 2022-12-19 ENCOUNTER — OFFICE VISIT (OUTPATIENT)
Dept: NEUROLOGY | Facility: CLINIC | Age: 57
End: 2022-12-19

## 2022-12-19 VITALS
HEART RATE: 82 BPM | DIASTOLIC BLOOD PRESSURE: 90 MMHG | SYSTOLIC BLOOD PRESSURE: 132 MMHG | WEIGHT: 185 LBS | HEIGHT: 64 IN | BODY MASS INDEX: 31.58 KG/M2 | OXYGEN SATURATION: 97 %

## 2022-12-19 DIAGNOSIS — G43.109 MIGRAINE WITH AURA, NOT INTRACTABLE, WITHOUT STATUS MIGRAINOSUS: Primary | ICD-10-CM

## 2022-12-19 DIAGNOSIS — R94.01 ABNORMAL EEG: ICD-10-CM

## 2022-12-19 DIAGNOSIS — R56.9 SEIZURE-LIKE ACTIVITY: ICD-10-CM

## 2022-12-19 PROCEDURE — 99214 OFFICE O/P EST MOD 30 MIN: CPT | Performed by: PSYCHIATRY & NEUROLOGY

## 2022-12-19 RX ORDER — POTASSIUM CHLORIDE 750 MG/1
10 TABLET, FILM COATED, EXTENDED RELEASE ORAL 2 TIMES DAILY
COMMUNITY
Start: 2022-11-11 | End: 2023-01-09 | Stop reason: SDUPTHER

## 2022-12-19 NOTE — PROGRESS NOTES
Chief Complaint   Patient presents with   • Migraine       Patient ID: Stromy Morrow is a 57 y.o. female.    HPI:   I had the pleasure of seeing your patient again today.  As you may know she is a 57-year-old female here for management of migraine headache.  She also has a history of abnormal EEG and seizure-like activity.  She is on Keppra 250 mg twice daily.  She is tolerating that dose well.  No side effects.  .  No current visual change.  No change of concentration or cognitive status.  With respect to migraine she seems to be doing well.  She has had 4-5 days of headaches within the last 30 days.  She will typically use naproxen as needed abortively.  She also is taking Emgality injections q. monthly.    The following portions of the patient's history were reviewed and updated as appropriate: allergies, current medications, past family history, past medical history, past social history, past surgical history and problem list.    Review of Systems   Neurological: Positive for speech difficulty and headaches. Negative for dizziness, tremors, seizures, syncope, weakness, light-headedness and numbness.   Hematological: Does not bruise/bleed easily.   Psychiatric/Behavioral: Positive for sleep disturbance. Negative for agitation, behavioral problems, confusion, decreased concentration, hallucinations, self-injury and suicidal ideas. The patient is not nervous/anxious.       I have reviewed the review of systems above performed by my medical assistant.      Vitals:    12/19/22 1247   BP: 132/90   Pulse: 82   SpO2: 97%       Neurologic Exam     Mental Status   Oriented to person, place, and time.   Concentration: normal.   Level of consciousness: alert  Knowledge: consistent with education (No deficits found.).     Cranial Nerves     CN II   Visual fields full to confrontation.     CN III, IV, VI   Pupils are equal, round, and reactive to light.  Extraocular motions are normal.   CN III: no CN III palsy  CN VI:  no CN VI palsy    CN V   Facial sensation intact.     CN VII   Facial expression full, symmetric.     CN VIII   CN VIII normal.     CN IX, X   CN IX normal.   CN X normal.     CN XI   CN XI normal.     CN XII   CN XII normal.     Motor Exam     Strength   Right neck flexion: 5/5  Left neck flexion: 5/5  Right neck extension: 5/5  Left neck extension: 5/5  Right deltoid: 5/5  Left deltoid: 5/5  Right biceps: 5/5  Left biceps: 5/5  Right triceps: 5/5  Left triceps: 5/5  Right wrist flexion: 5/5  Left wrist flexion: 5/5  Right wrist extension: 5/5  Left wrist extension: 5/5  Right interossei: 5/5  Left interossei: 5/5  Right abdominals: 5/5  Left abdominals: 5/5  Right iliopsoas: 5/5  Left iliopsoas: 5/5  Right quadriceps: 5/5  Left quadriceps: 5/5  Right hamstrin/5  Left hamstrin/5  Right glutei: 5/5  Left glutei: 5/5  Right anterior tibial: 5/5  Left anterior tibial: 5/5  Right posterior tibial: 5/5  Left posterior tibial: 5/5  Right peroneal: 5/5  Left peroneal: 5/5  Right gastroc: 5/5  Left gastroc: 5/5    Sensory Exam   Light touch normal.   Vibration normal.     Gait, Coordination, and Reflexes     Gait  Gait: normal    Reflexes   Right brachioradialis: 2+  Left brachioradialis: 2+  Right biceps: 2+  Left biceps: 2+  Right triceps: 2+  Left triceps: 2+  Right patellar: 2+  Left patellar: 2+  Right achilles: 2+  Left achilles: 2+  Right : 2+  Left : 2+Station is normal.       Physical Exam  Vitals reviewed.   Constitutional:       Appearance: She is well-developed.   HENT:      Head: Normocephalic and atraumatic.   Eyes:      Extraocular Movements: EOM normal.      Pupils: Pupils are equal, round, and reactive to light.   Cardiovascular:      Rate and Rhythm: Normal rate and regular rhythm.   Pulmonary:      Breath sounds: Normal breath sounds.   Musculoskeletal:         General: Normal range of motion.   Skin:     General: Skin is warm.   Neurological:      Mental Status: She is oriented to  person, place, and time.      Gait: Gait is intact.      Deep Tendon Reflexes:      Reflex Scores:       Tricep reflexes are 2+ on the right side and 2+ on the left side.       Bicep reflexes are 2+ on the right side and 2+ on the left side.       Brachioradialis reflexes are 2+ on the right side and 2+ on the left side.       Patellar reflexes are 2+ on the right side and 2+ on the left side.       Achilles reflexes are 2+ on the right side and 2+ on the left side.        Procedures    Assessment/Plan: We are going to continue the Emgality as well as naproxen.  Continue Keppra as scheduled.  Continue with we will see her annually in 6 months or sooner.  A total of 30 minutes face-to-face with patient today.  Of that greater than 50% of this time was spent discussing signs and, patient education, seizures, patient education, plan of care and prognosis.       Diagnoses and all orders for this visit:    1. Migraine with aura, not intractable, without status migrainosus (Primary)    2. Abnormal EEG    3. Seizure-like activity (HCC)           Chuy Dasilva II, MD

## 2022-12-28 RX ORDER — MONTELUKAST SODIUM 10 MG/1
TABLET ORAL
Qty: 90 TABLET | Refills: 3 | Status: SHIPPED | OUTPATIENT
Start: 2022-12-28

## 2022-12-28 NOTE — TELEPHONE ENCOUNTER
Rx Refill Note  Requested Prescriptions     Pending Prescriptions Disp Refills   • montelukast (SINGULAIR) 10 MG tablet [Pharmacy Med Name: MONTELUKAST SODIUM TABS 10MG] 90 tablet 3     Sig: TAKE 1 TABLET DAILY      Last office visit with prescribing clinician: 5/17/2022   Last telemedicine visit with prescribing clinician: Visit date not found   Next office visit with prescribing clinician: Visit date not found                         Would you like a call back once the refill request has been completed: [] Yes [] No    If the office needs to give you a call back, can they leave a voicemail: [] Yes [] No    Bushra Chi CMA  12/28/22, 11:14 EST

## 2023-01-09 ENCOUNTER — OFFICE VISIT (OUTPATIENT)
Dept: FAMILY MEDICINE CLINIC | Facility: CLINIC | Age: 58
End: 2023-01-09
Payer: COMMERCIAL

## 2023-01-09 VITALS
RESPIRATION RATE: 18 BRPM | HEIGHT: 64 IN | DIASTOLIC BLOOD PRESSURE: 88 MMHG | BODY MASS INDEX: 30.73 KG/M2 | TEMPERATURE: 97.8 F | HEART RATE: 98 BPM | WEIGHT: 180 LBS | OXYGEN SATURATION: 99 % | SYSTOLIC BLOOD PRESSURE: 143 MMHG

## 2023-01-09 DIAGNOSIS — R05.8 OTHER COUGH: Primary | ICD-10-CM

## 2023-01-09 DIAGNOSIS — J06.9 UPPER RESPIRATORY TRACT INFECTION, UNSPECIFIED TYPE: ICD-10-CM

## 2023-01-09 LAB
EXPIRATION DATE: NORMAL
FLUAV AG UPPER RESP QL IA.RAPID: NOT DETECTED
FLUBV AG UPPER RESP QL IA.RAPID: NOT DETECTED
INTERNAL CONTROL: NORMAL
Lab: NORMAL
SARS-COV-2 AG UPPER RESP QL IA.RAPID: NOT DETECTED

## 2023-01-09 PROCEDURE — 87428 SARSCOV & INF VIR A&B AG IA: CPT | Performed by: NURSE PRACTITIONER

## 2023-01-09 PROCEDURE — 99213 OFFICE O/P EST LOW 20 MIN: CPT | Performed by: NURSE PRACTITIONER

## 2023-01-09 RX ORDER — BENZONATATE 100 MG/1
100 CAPSULE ORAL 3 TIMES DAILY PRN
Qty: 30 CAPSULE | Refills: 0 | Status: SHIPPED | OUTPATIENT
Start: 2023-01-09

## 2023-01-09 RX ORDER — DOXYCYCLINE HYCLATE 100 MG/1
100 CAPSULE ORAL 2 TIMES DAILY
Qty: 14 CAPSULE | Refills: 0 | Status: SHIPPED | OUTPATIENT
Start: 2023-01-09 | End: 2023-01-25

## 2023-01-09 NOTE — ASSESSMENT & PLAN NOTE
Started 3 weeks ago. Feels like has gone from sinuses to chest congestion. Has been coughing up yellow and green and clear. Has  Had a low grade fever. Has asthma and has been using inhaler at home. Has some blood tinged sputum as well. Has had a low grade fever. Denies nausea, vomiting, diarrhea, chills. Has had some chest spasms and burning. 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  Has been taking cough syrup, hot showed, dayquil/nyquil at home with minimal relief.     COVID and flu negative     No wheezing noted in exam    Encourage mucinex, stay hydrated  Encourage Stahist AD  Prescribed doxycycline, tesslon perles, and trelegy   Continue to use inhaler

## 2023-01-09 NOTE — PROGRESS NOTES
Chief Complaint  Chief Complaint   Patient presents with   • Sinusitis     X 3 wks   • cough        Subjective          Stormy Morrow is a 57 year old female who presents to the office today with complaints of cough and congestion.    Cough/Congestion- Started 3 weeks ago. Feels like has gone from sinuses to chest congestion. Has been coughing up yellow and green and clear. Has  Had a low grade fever. Has asthma and has been using inhaler at home. Has some blood tinged sputum as well. Has had a low grade fever. Denies nausea, vomiting, diarrhea, chills. Has had some chest spasms and burning. 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  Has been taking cough syrup, hot showed, dayquil/nyquil at home with minimal relief.        Review of Systems   Constitutional: Positive for fever. Negative for chills.   HENT: Positive for congestion, ear pain and sore throat.    Respiratory: Positive for cough, shortness of breath and wheezing.    Cardiovascular: Positive for chest pain.   Gastrointestinal: Negative for abdominal pain, nausea and vomiting.   Genitourinary: Negative for difficulty urinating.   Musculoskeletal: Negative for arthralgias.   Skin: Negative.    Neurological: Positive for dizziness. Negative for light-headedness and headache.        Objective   Vital Signs:   Vitals:    01/09/23 1255   BP: 143/88   Pulse: 98   Resp: 18   Temp: 97.8 °F (36.6 °C)   SpO2: 99%      Estimated body mass index is 30.9 kg/m² as calculated from the following:    Height as of this encounter: 162.6 cm (64\").    Weight as of this encounter: 81.6 kg (180 lb).            Physical Exam  Vitals reviewed.   Constitutional:       Appearance: Normal appearance. She is obese.   HENT:      Head: Normocephalic and atraumatic.      Ears:      Comments: Middle ear erythema noted bilaterally      Nose: Nose normal.      Mouth/Throat:      Mouth: Mucous membranes are moist.      Pharynx: Oropharynx is clear.  Posterior oropharyngeal erythema present.   Eyes:      Extraocular Movements: Extraocular movements intact.      Conjunctiva/sclera: Conjunctivae normal.      Pupils: Pupils are equal, round, and reactive to light.   Cardiovascular:      Rate and Rhythm: Normal rate and regular rhythm.      Pulses: Normal pulses.      Heart sounds: Normal heart sounds.      Comments: S1, S2 audible  Pulmonary:      Effort: Pulmonary effort is normal.      Breath sounds: Normal breath sounds.      Comments: On room air   Abdominal:      General: Abdomen is flat. Bowel sounds are normal.      Palpations: Abdomen is soft.   Musculoskeletal:         General: Normal range of motion.      Cervical back: Normal range of motion.   Skin:     General: Skin is warm and dry.   Neurological:      General: No focal deficit present.      Mental Status: She is alert and oriented to person, place, and time. Mental status is at baseline.   Psychiatric:         Mood and Affect: Mood normal.         Behavior: Behavior normal.         Thought Content: Thought content normal.         Judgment: Judgment normal.                Physical Exam   Result Review :             Procedures       Assessment and Plan     Diagnoses and all orders for this visit:    1. Other cough (Primary)  -     POCT SARS-CoV-2 Antigen DIEGO    2. Upper respiratory tract infection, unspecified type  Assessment & Plan:  Started 3 weeks ago. Feels like has gone from sinuses to chest congestion. Has been coughing up yellow and green and clear. Has  Had a low grade fever. Has asthma and has been using inhaler at home. Has some blood tinged sputum as well. Has had a low grade fever. Denies nausea, vomiting, diarrhea, chills. Has had some chest spasms and burning. 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  Has been taking cough syrup, hot showed, dayquil/nyquil at home with minimal relief.     COVID and flu negative     No wheezing noted in exam    Encourage  mucinex, stay hydrated  Encourage Stahist AD  Prescribed doxycycline, tesslon perles, and trelegy   Continue to use inhaler       Other orders  -     Fluticasone-Umeclidin-Vilant (TRELEGY) 100-62.5-25 MCG/ACT inhaler; Inhale 1 puff Daily.  Dispense: 28 each; Refill: 0  -     benzonatate (Tessalon Perles) 100 MG capsule; Take 1 capsule by mouth 3 (Three) Times a Day As Needed for Cough.  Dispense: 30 capsule; Refill: 0  -     doxycycline (VIBRAMYCIN) 100 MG capsule; Take 1 capsule by mouth 2 (Two) Times a Day.  Dispense: 14 capsule; Refill: 0            Follow Up   Return if symptoms worsen or fail to improve.   Patient was given instructions and counseling regarding her condition or for health maintenance advice. Please see specific information pulled into the AVS if appropriate.

## 2023-01-09 NOTE — PATIENT INSTRUCTIONS
Encourage mucinex, stay hydrated  Encourage Stahist AD  Prescribed doxycycline, tesskaylee perles, and trelegy   Continue to use inhaler

## 2023-01-25 ENCOUNTER — OFFICE VISIT (OUTPATIENT)
Dept: FAMILY MEDICINE CLINIC | Facility: CLINIC | Age: 58
End: 2023-01-25
Payer: COMMERCIAL

## 2023-01-25 VITALS
WEIGHT: 180 LBS | BODY MASS INDEX: 30.73 KG/M2 | SYSTOLIC BLOOD PRESSURE: 142 MMHG | HEIGHT: 64 IN | DIASTOLIC BLOOD PRESSURE: 82 MMHG | HEART RATE: 115 BPM | OXYGEN SATURATION: 97 % | TEMPERATURE: 98.4 F | RESPIRATION RATE: 18 BRPM

## 2023-01-25 DIAGNOSIS — H65.192 OTHER NON-RECURRENT ACUTE NONSUPPURATIVE OTITIS MEDIA OF LEFT EAR: ICD-10-CM

## 2023-01-25 DIAGNOSIS — J06.9 UPPER RESPIRATORY TRACT INFECTION, UNSPECIFIED TYPE: Primary | ICD-10-CM

## 2023-01-25 PROBLEM — H66.90 ACUTE OTITIS MEDIA: Status: ACTIVE | Noted: 2023-01-25

## 2023-01-25 PROCEDURE — 99213 OFFICE O/P EST LOW 20 MIN: CPT | Performed by: NURSE PRACTITIONER

## 2023-01-25 PROCEDURE — 87428 SARSCOV & INF VIR A&B AG IA: CPT | Performed by: NURSE PRACTITIONER

## 2023-01-25 RX ORDER — CEFDINIR 300 MG/1
300 CAPSULE ORAL 2 TIMES DAILY
Qty: 14 CAPSULE | Refills: 0 | Status: SHIPPED | OUTPATIENT
Start: 2023-01-25

## 2023-01-25 RX ORDER — BROMPHENIRAMINE MALEATE, PSEUDOEPHEDRINE HYDROCHLORIDE, AND DEXTROMETHORPHAN HYDROBROMIDE 2; 30; 10 MG/5ML; MG/5ML; MG/5ML
5 SYRUP ORAL 4 TIMES DAILY PRN
Qty: 118 ML | Refills: 0 | Status: SHIPPED | OUTPATIENT
Start: 2023-01-25

## 2023-01-25 NOTE — PROGRESS NOTES
Chief Complaint  Chief Complaint   Patient presents with   • Sinusitis   • URI        Subjective          Stormy Morrow is a 57 year old female who presents to the office today with complaints of congestion/cough.    Cough/congestion- Was sick a couple of weeks ago and felt better. Yesterday reports she hiked 5 miles and had a asthma attack afterwards. Cough, wheezing, and congestion worse when laying down. Has had low grade fever and some chills. Was around  who had cold recently. Has been under a lot of stress with nephew passing away. Has been coughing up clear and yellowish/green. Finished entire course of antibiotics.         Previous note from 1/9/2023 reviewed:  Started 3 weeks ago. Feels like has gone from sinuses to chest congestion. Has been coughing up yellow and green and clear. Has  Had a low grade fever. Has asthma and has been using inhaler at home. Has some blood tinged sputum as well. Has had a low grade fever. Denies nausea, vomiting, diarrhea, chills. Has had some chest spasms and burning. 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  Has been taking cough syrup, hot showed, dayquil/nyquil at home with minimal relief.      COVID and flu negative      No wheezing noted in exam     Encourage mucinex, stay hydrated  Encourage Stahist AD  Prescribed doxycycline, tesslon perles, and trelegy   Continue to use inhaler        Review of Systems   Constitutional: Positive for chills and fever.   HENT: Positive for congestion and ear pain.    Respiratory: Positive for cough, shortness of breath and wheezing.    Cardiovascular: Negative for chest pain.        Chest tightness   Musculoskeletal: Positive for arthralgias.   Skin: Negative.    Neurological: Negative for dizziness, light-headedness and headache.        Objective   Vital Signs:   Vitals:    01/25/23 1453   BP: 142/82   Pulse: 115   Resp: 18   Temp: 98.4 °F (36.9 °C)   SpO2: 97%      Estimated body mass index is  "30.9 kg/m² as calculated from the following:    Height as of this encounter: 162.6 cm (64\").    Weight as of this encounter: 81.6 kg (180 lb).            Physical Exam  Vitals reviewed.   Constitutional:       Appearance: She is obese. She is ill-appearing.   HENT:      Head: Normocephalic and atraumatic.      Ears:      Comments: Bilateral erythema with right ear having yellow drainage      Nose: Nose normal.      Mouth/Throat:      Mouth: Mucous membranes are moist.      Pharynx: Oropharynx is clear. Posterior oropharyngeal erythema present.   Eyes:      Extraocular Movements: Extraocular movements intact.      Conjunctiva/sclera: Conjunctivae normal.      Pupils: Pupils are equal, round, and reactive to light.   Cardiovascular:      Rate and Rhythm: Normal rate and regular rhythm.      Pulses: Normal pulses.      Heart sounds: Normal heart sounds.      Comments: S1, S2 audible  Pulmonary:      Effort: Pulmonary effort is normal.      Breath sounds: Normal breath sounds.      Comments: On room air   Abdominal:      General: Abdomen is flat. Bowel sounds are normal.      Palpations: Abdomen is soft.   Musculoskeletal:         General: Normal range of motion.      Cervical back: Normal range of motion.   Skin:     General: Skin is warm and dry.   Neurological:      General: No focal deficit present.      Mental Status: She is alert and oriented to person, place, and time. Mental status is at baseline.   Psychiatric:         Mood and Affect: Mood normal.         Behavior: Behavior normal.         Thought Content: Thought content normal.         Judgment: Judgment normal.                Physical Exam   Result Review :             Procedures       Assessment and Plan     Diagnoses and all orders for this visit:    1. Upper respiratory tract infection, unspecified type (Primary)  Assessment & Plan:  Was sick a couple of weeks ago and felt better. Yesterday reports she hiked 5 miles and had a asthma attack afterwards. " Cough, wheezing, and congestion worse when laying down. Has had low grade fever and some chills. Was around  who had cold recently. Has been under a lot of stress with nephew passing away. Has been coughing up clear and yellowish/green. Finished entire course of antibiotics.     COVID and Flu negative    No wheezing noted upon exam    Encourage mucinex, stay hydrated     Reports tesslon perles help with cough     Continue Trelegy (sample given) and allergy medication    Prescribed bromfed         Orders:  -     POCT SARS-CoV-2 Antigen DIEGO    2. Other non-recurrent acute nonsuppurative otitis media of left ear  Assessment & Plan:  Bilateral ear erythema noted with drainage in left ear that is yellow    Prescribed Omnicef     Orders:  -     POCT SARS-CoV-2 Antigen DIEGO    Other orders  -     cefdinir (OMNICEF) 300 MG capsule; Take 1 capsule by mouth 2 (Two) Times a Day.  Dispense: 14 capsule; Refill: 0  -     brompheniramine-pseudoephedrine-DM 30-2-10 MG/5ML syrup; Take 5 mL by mouth 4 (Four) Times a Day As Needed for Cough.  Dispense: 118 mL; Refill: 0            Follow Up   Return if symptoms worsen or fail to improve.   Patient was given instructions and counseling regarding her condition or for health maintenance advice. Please see specific information pulled into the AVS if appropriate.

## 2023-01-25 NOTE — ASSESSMENT & PLAN NOTE
Was sick a couple of weeks ago and felt better. Yesterday reports she hiked 5 miles and had a asthma attack afterwards. Cough, wheezing, and congestion worse when laying down. Has had low grade fever and some chills. Was around  who had cold recently. Has been under a lot of stress with nephew passing away. Has been coughing up clear and yellowish/green. Finished entire course of antibiotics.     COVID and Flu negative    No wheezing noted upon exam    Encourage mucinex, stay hydrated     Reports tesslon perles help with cough     Continue Trelegy (sample given) and allergy medication    Prescribed bromfed

## 2023-01-25 NOTE — PATIENT INSTRUCTIONS
Prescribed Omnicef- for ear infection  Prescribed bromfed for cough/secretion  Encourage continue use to mucinex, allergy medication, and trelegy

## 2023-03-02 RX ORDER — LEVETIRACETAM 250 MG/1
TABLET ORAL
Qty: 60 TABLET | Refills: 11 | Status: SHIPPED | OUTPATIENT
Start: 2023-03-02

## 2023-04-14 ENCOUNTER — TELEPHONE (OUTPATIENT)
Dept: FAMILY MEDICINE CLINIC | Facility: CLINIC | Age: 58
End: 2023-04-14

## 2023-04-14 NOTE — TELEPHONE ENCOUNTER
HUB TO READ    I left a voicemail for the pt making her aware her appointment for her physical on 4/19/23 with Ruby Barber needs to be rescheduled with Dr. Tracy for her annual physical she is a pt of Dr. Tracy.

## 2023-04-19 DIAGNOSIS — G43.109 MIGRAINE WITH AURA, NOT INTRACTABLE, WITHOUT STATUS MIGRAINOSUS: ICD-10-CM

## 2023-04-19 RX ORDER — GALCANEZUMAB 120 MG/ML
120 INJECTION, SOLUTION SUBCUTANEOUS
Qty: 3 ML | Refills: 3 | Status: SHIPPED | OUTPATIENT
Start: 2023-04-19

## 2023-05-30 ENCOUNTER — TELEPHONE (OUTPATIENT)
Dept: NEUROLOGY | Facility: CLINIC | Age: 58
End: 2023-05-30

## 2023-07-10 ENCOUNTER — OFFICE VISIT (OUTPATIENT)
Dept: FAMILY MEDICINE CLINIC | Facility: CLINIC | Age: 58
End: 2023-07-10
Payer: COMMERCIAL

## 2023-07-10 VITALS
WEIGHT: 193 LBS | HEART RATE: 95 BPM | TEMPERATURE: 97.8 F | DIASTOLIC BLOOD PRESSURE: 78 MMHG | OXYGEN SATURATION: 98 % | SYSTOLIC BLOOD PRESSURE: 116 MMHG | HEIGHT: 64 IN | RESPIRATION RATE: 16 BRPM | BODY MASS INDEX: 32.95 KG/M2

## 2023-07-10 DIAGNOSIS — R31.21 ASYMPTOMATIC MICROSCOPIC HEMATURIA: ICD-10-CM

## 2023-07-10 DIAGNOSIS — Z12.31 SCREENING MAMMOGRAM, ENCOUNTER FOR: ICD-10-CM

## 2023-07-10 DIAGNOSIS — G89.29 CHRONIC PAIN OF BOTH ANKLES: ICD-10-CM

## 2023-07-10 DIAGNOSIS — M25.572 CHRONIC PAIN OF BOTH ANKLES: ICD-10-CM

## 2023-07-10 DIAGNOSIS — M95.0 NASAL DEFORMITY, ACQUIRED: ICD-10-CM

## 2023-07-10 DIAGNOSIS — Z00.00 ANNUAL PHYSICAL EXAM: Primary | ICD-10-CM

## 2023-07-10 DIAGNOSIS — Z12.11 SCREENING FOR COLON CANCER: ICD-10-CM

## 2023-07-10 DIAGNOSIS — M25.571 CHRONIC PAIN OF BOTH ANKLES: ICD-10-CM

## 2023-07-10 DIAGNOSIS — Z13.220 SCREENING FOR LIPID DISORDERS: ICD-10-CM

## 2023-07-10 LAB
BILIRUB BLD-MCNC: NEGATIVE MG/DL
CLARITY, POC: CLEAR
COLOR UR: YELLOW
EXPIRATION DATE: ABNORMAL
GLUCOSE UR STRIP-MCNC: NEGATIVE MG/DL
KETONES UR QL: NEGATIVE
LEUKOCYTE EST, POC: ABNORMAL
Lab: ABNORMAL
NITRITE UR-MCNC: NEGATIVE MG/ML
PH UR: 7 [PH] (ref 5–8)
PROT UR STRIP-MCNC: ABNORMAL MG/DL
RBC # UR STRIP: ABNORMAL /UL
SP GR UR: 1.02 (ref 1–1.03)
UROBILINOGEN UR QL: NORMAL

## 2023-07-10 PROCEDURE — 36415 COLL VENOUS BLD VENIPUNCTURE: CPT | Performed by: FAMILY MEDICINE

## 2023-07-10 PROCEDURE — 81003 URINALYSIS AUTO W/O SCOPE: CPT | Performed by: FAMILY MEDICINE

## 2023-07-10 PROCEDURE — 99396 PREV VISIT EST AGE 40-64: CPT | Performed by: FAMILY MEDICINE

## 2023-07-10 RX ORDER — ALBUTEROL SULFATE 90 UG/1
2 AEROSOL, METERED RESPIRATORY (INHALATION) EVERY 6 HOURS PRN
Qty: 18 G | Refills: 0 | Status: SHIPPED | OUTPATIENT
Start: 2023-07-10

## 2023-07-10 RX ORDER — MONTELUKAST SODIUM 10 MG/1
10 TABLET ORAL DAILY
Qty: 90 TABLET | Refills: 2 | Status: SHIPPED | OUTPATIENT
Start: 2023-07-10

## 2023-07-10 RX ORDER — EPINEPHRINE 0.3 MG/.3ML
0.3 INJECTION SUBCUTANEOUS ONCE AS NEEDED
Qty: 2 EACH | Refills: 0 | Status: SHIPPED | OUTPATIENT
Start: 2023-07-10

## 2023-07-10 NOTE — PROGRESS NOTES
Venipuncture performed in left arm by Bushra Chi CMA  with good hemostasis. Patient tolerated well. 07/10/23 Bushra Chi CMA

## 2023-07-10 NOTE — PROGRESS NOTES
Subjective   Stormy Morrow is a 57 y.o. female.     Chief Complaint   Patient presents with    Annual Exam     Physical with papsmear         Current Outpatient Medications:     albuterol sulfate  (90 Base) MCG/ACT inhaler, Inhale 2 puffs Every 6 (Six) Hours As Needed for Wheezing or Shortness of Air., Disp: 18 g, Rfl: 0    Cetirizine HCl 10 MG capsule, Take 1 capsule by mouth Daily., Disp: 90 capsule, Rfl: 3    chlorthalidone (HYGROTON) 25 MG tablet, Take 12.5 mg by mouth Daily., Disp: , Rfl:     EPINEPHrine (EPIPEN) 0.3 MG/0.3ML solution auto-injector injection, Inject 0.3 mL into the appropriate muscle as directed by prescriber 1 (One) Time As Needed (swelling/ corona) for up to 1 dose., Disp: 2 each, Rfl: 0    fluticasone (Flonase Sensimist) 27.5 MCG/SPRAY nasal spray, 2 sprays into the nostril(s) as directed by provider Daily., Disp: 27.3 mL, Rfl: 3    levETIRAcetam (KEPPRA) 250 MG tablet, TAKE 1 TABLET TWICE A DAY, Disp: 60 tablet, Rfl: 11    Magnesium 100 MG capsule, 1 po qd, Disp: , Rfl:     montelukast (SINGULAIR) 10 MG tablet, Take 1 tablet by mouth Daily., Disp: 90 tablet, Rfl: 2    Nutritional Supplements (THERALITH XR) tablet, Take 2 tablets by mouth 2 (Two) Times a Day., Disp: , Rfl:     potassium chloride (K-DUR,KLOR-CON) 10 MEQ CR tablet, Take 1 tablet by mouth Daily., Disp: , Rfl:     Emgality 120 MG/ML auto-injector pen, INJECT 1 ML UNDER THE SKIN INTO THE APPROPRIATE AREA AS DIRECTED EVERY 30 DAYS, Disp: 3 mL, Rfl: 3    estradiol (ESTRACE) 0.5 MG tablet, Take 1 tablet by mouth Daily., Disp: 90 tablet, Rfl: 0    medroxyPROGESTERone (PROVERA) 2.5 MG tablet, Take 1 tablet by mouth Daily., Disp: 90 tablet, Rfl: 0    Past Medical History:   Diagnosis Date    Allergic     Asthma 11/21/2019    Bell palsy     Rt Side    Heart murmur     Hypertension Octavio    Possibly prehypertensive    Kidney stone     MAMMO     NEG = 2018/ 2019/ 2021/ 2022    Migraine     PAP     NEG = 2018/ 2019/ 2022/ 2023     Raynaud phenomenon     Seizures        Past Surgical History:   Procedure Laterality Date    APPENDECTOMY  07/2000    COLONOSCOPY      Cologuard ----2019= NEG, rech 2022    CYSTOSCOPY BLADDER STONE LITHOTRIPSY  2019/ 2020    x 2    CYSTOSCOPY W/ LASER LITHOTRIPSY  2022       Family History   Problem Relation Age of Onset    Arthritis Mother     Osteoporosis Mother     Stroke Mother     Hypertension Mother     Miscarriages / Stillbirths Mother         Miscarriage    COPD Mother     Arthritis Father     Mental illness Father         Ptsd combat  medic    Hyperlipidemia Father     Kidney disease Father         Renal tubular acidosis & kidney stones    Obesity Father     Hypertension Father     Kidney nephrosis Father     Tongue cancer Father     Hypertension Brother     Kidney nephrosis Brother     Arthritis Maternal Grandmother     Cancer Maternal Grandmother     Obesity Maternal Grandmother     Breast cancer Maternal Grandmother     Cancer Maternal Grandfather     Stroke Paternal Grandfather         Age 76    Migraines Daughter     Depression Daughter     Hyperlipidemia Son     Kidney disease Son         Kidney stones    Migraines Maternal Aunt     Hyperlipidemia Maternal Uncle     Heart attack Maternal Uncle        Social History     Socioeconomic History    Marital status:    Tobacco Use    Smoking status: Never     Passive exposure: Never    Smokeless tobacco: Never    Tobacco comments:     Second hand smoke parents   Vaping Use    Vaping Use: Never used   Substance and Sexual Activity    Alcohol use: Never    Drug use: Never    Sexual activity: Yes     Partners: Male     Birth control/protection: Post-menopausal       History of Present Illness  58 y/o C female here for annual PE w/ pap w/ hx/o HTN/ Asthma/ HA/ Allergies     The patient complains of arthritis in her ankles bilaterally. She reports that she hikes and is becoming painful for her. She had an x-ray performed when she was experiencing  plantar fasciitis and found arthritis in her ankles. She wears hiking boots when she hikes. She admits to playing soccer when she was young and experiencing many sprained ankle injuries.     The patient complains of deviated septum and denies receiving enough oxygen. She uses Flonase but denies using breathe right strips at night.     The patient denies having an abnormal Pap in the past. She was given a diuretic medication by Dr. Frost. She takes Keppra 250 twice daily due to body twitches that last approximately 8 hours. She denies any family history of colon cancer. Her mother has COPD and is a smoker. Her father has oral and tongue cancer. She sees Chuy Dasilva every 6 months. She sees an eye doctor and dentist regularly.     The following portions of the patient's history were reviewed and updated as appropriate: allergies, current medications, past family history, past medical history, past social history, past surgical history and problem list.    Review of Systems   Constitutional:  Negative for activity change, appetite change, fatigue, unexpected weight gain and unexpected weight loss.   HENT:  Negative for congestion, ear pain, hearing loss, nosebleeds, postnasal drip, rhinorrhea, sinus pressure, sneezing, sore throat, tinnitus and trouble swallowing.    Eyes:  Negative for blurred vision and double vision.   Respiratory:  Positive for apnea. Negative for cough and shortness of breath.    Cardiovascular:  Negative for chest pain.   Gastrointestinal:  Negative for abdominal pain, constipation, diarrhea, nausea, vomiting, GERD and indigestion.   Genitourinary:  Negative for difficulty urinating, dysuria, flank pain, frequency, urgency and urinary incontinence.   Musculoskeletal:  Positive for arthralgias and gait problem. Negative for myalgias.   Skin:  Negative for rash and skin lesions.   Neurological:  Negative for seizures, weakness, headache, memory problem and confusion.   Psychiatric/Behavioral:   Negative for agitation, self-injury, suicidal ideas, depressed mood and stress. The patient is not nervous/anxious.      Vitals:    07/10/23 0905   BP: 116/78   Pulse: 95   Resp: 16   Temp: 97.8 °F (36.6 °C)   SpO2: 98%       Objective   Physical Exam  Vitals and nursing note reviewed.   Constitutional:       General: She is not in acute distress.     Appearance: Normal appearance. She is well-developed. She is not ill-appearing or toxic-appearing.   HENT:      Head: Normocephalic and atraumatic.      Right Ear: Tympanic membrane, ear canal and external ear normal. There is no impacted cerumen.      Left Ear: Tympanic membrane, ear canal and external ear normal. There is no impacted cerumen.      Nose: Nose normal. No congestion or rhinorrhea.      Mouth/Throat:      Mouth: Mucous membranes are moist.      Pharynx: Oropharynx is clear. No oropharyngeal exudate or posterior oropharyngeal erythema.   Eyes:      Extraocular Movements: Extraocular movements intact.      Conjunctiva/sclera: Conjunctivae normal.      Pupils: Pupils are equal, round, and reactive to light.   Neck:      Thyroid: No thyromegaly.   Cardiovascular:      Rate and Rhythm: Normal rate and regular rhythm.      Pulses: Normal pulses.      Heart sounds: Normal heart sounds. No murmur heard.  Pulmonary:      Effort: Pulmonary effort is normal. No respiratory distress.      Breath sounds: Normal breath sounds. No stridor. No wheezing or rales.   Chest:   Breasts:     Right: Normal. No swelling, inverted nipple, mass, nipple discharge, skin change or tenderness.      Left: Normal. No swelling, inverted nipple, mass, nipple discharge, skin change or tenderness.   Abdominal:      General: Bowel sounds are normal. There is no distension.      Palpations: Abdomen is soft. There is no mass.      Tenderness: There is no abdominal tenderness. There is no guarding or rebound.      Hernia: No hernia is present. There is no hernia in the left inguinal area or  right inguinal area.   Genitourinary:     General: Normal vulva.      Exam position: Supine.      Labia:         Right: No rash or lesion.         Left: No rash or lesion.       Vagina: Normal. No foreign body. No vaginal discharge, erythema, tenderness, bleeding or lesions.      Cervix: No cervical motion tenderness, discharge, friability, lesion or eversion.      Uterus: Normal.       Adnexa: Right adnexa normal and left adnexa normal.        Right: No mass, tenderness or fullness.          Left: No mass, tenderness or fullness.     Musculoskeletal:         General: No tenderness. Normal range of motion.      Cervical back: Normal range of motion and neck supple.      Right lower leg: No edema.      Left lower leg: No edema.   Lymphadenopathy:      Cervical: No cervical adenopathy.      Upper Body:      Right upper body: No supraclavicular or axillary adenopathy.      Left upper body: No supraclavicular or axillary adenopathy.   Skin:     General: Skin is warm and dry.      Capillary Refill: Capillary refill takes less than 2 seconds.      Findings: No erythema, lesion or rash.   Neurological:      General: No focal deficit present.      Mental Status: She is alert and oriented to person, place, and time.      Cranial Nerves: No cranial nerve deficit.      Motor: No abnormal muscle tone.      Deep Tendon Reflexes: Reflexes normal.   Psychiatric:         Attention and Perception: Attention and perception normal.         Mood and Affect: Mood and affect normal.         Speech: Speech normal.         Behavior: Behavior normal. Behavior is cooperative.         Thought Content: Thought content normal.         Cognition and Memory: Cognition and memory normal.         Judgment: Judgment normal.       BMI is >= 30 and <35. (Class 1 Obesity). The following options were offered after discussion;: exercise counseling/recommendations and nutrition counseling/recommendations      Assessment & Plan   Diagnoses and all orders  for this visit:    1. Annual physical exam (Primary)  -     POC Urinalysis Dipstick, Automated  -     IGP,Aptima HPV,Age Gdln  -     IGP, Aptima HPV, Rfx 16 / 18,45  -     PDF Report    2. Nasal deformity, acquired  -     Ambulatory Referral to ENT (Otolaryngology)    3. Chronic pain of both ankles  -     Ambulatory Referral to Orthopedic Surgery    4. Asymptomatic microscopic hematuria  -     Urine Culture - Urine, Urine, Clean Catch; Future  -     Urine Culture - Urine, Urine, Clean Catch    5. Screening mammogram, encounter for  -     Mammo Screening Digital Tomosynthesis Bilateral With CAD; Future    6. Screening for colon cancer  -     Cologuard - Stool, Per Rectum; Future    7. Screening for lipid disorders  -     Lipid Panel  -     Comprehensive Metabolic Panel    Other orders  -     albuterol sulfate  (90 Base) MCG/ACT inhaler; Inhale 2 puffs Every 6 (Six) Hours As Needed for Wheezing or Shortness of Air.  Dispense: 18 g; Refill: 0  -     EPINEPHrine (EPIPEN) 0.3 MG/0.3ML solution auto-injector injection; Inject 0.3 mL into the appropriate muscle as directed by prescriber 1 (One) Time As Needed (swelling/ corona) for up to 1 dose.  Dispense: 2 each; Refill: 0  -     montelukast (SINGULAIR) 10 MG tablet; Take 1 tablet by mouth Daily.  Dispense: 90 tablet; Refill: 2    Med refills  Order referral to ENT for nasal deformity eval  Ordered referral to orthopedic for ankle pain  Rx---Cologuard/ Pap  Fasting labs today   Transcribed from ambient dictation for Nicole Tracy DO by Sindy Foster.  07/10/23   14:10 EDT    Patient or patient representative verbalized consent to the visit recording.  I have personally performed the services described in this document as transcribed by the above individual, and it is both accurate and complete.

## 2023-07-11 LAB
ALBUMIN SERPL-MCNC: 4.3 G/DL (ref 3.5–5.2)
ALBUMIN/GLOB SERPL: 1.3 G/DL
ALP SERPL-CCNC: 139 U/L (ref 39–117)
ALT SERPL W P-5'-P-CCNC: 27 U/L (ref 1–33)
ANION GAP SERPL CALCULATED.3IONS-SCNC: 16 MMOL/L (ref 5–15)
AST SERPL-CCNC: 38 U/L (ref 1–32)
BACTERIA SPEC AEROBE CULT: NO GROWTH
BILIRUB SERPL-MCNC: 0.6 MG/DL (ref 0–1.2)
BUN SERPL-MCNC: 18 MG/DL (ref 6–20)
BUN/CREAT SERPL: 26.5 (ref 7–25)
CALCIUM SPEC-SCNC: 10.1 MG/DL (ref 8.6–10.5)
CHLORIDE SERPL-SCNC: 96 MMOL/L (ref 98–107)
CHOLEST SERPL-MCNC: 185 MG/DL (ref 0–200)
CO2 SERPL-SCNC: 24 MMOL/L (ref 22–29)
CREAT SERPL-MCNC: 0.68 MG/DL (ref 0.57–1)
EGFRCR SERPLBLD CKD-EPI 2021: 103 ML/MIN/1.73
GLOBULIN UR ELPH-MCNC: 3.4 GM/DL
GLUCOSE SERPL-MCNC: 98 MG/DL (ref 65–99)
HDLC SERPL-MCNC: 57 MG/DL (ref 40–60)
LDLC SERPL CALC-MCNC: 111 MG/DL (ref 0–100)
LDLC/HDLC SERPL: 1.92 {RATIO}
POTASSIUM SERPL-SCNC: 3.3 MMOL/L (ref 3.5–5.2)
PROT SERPL-MCNC: 7.7 G/DL (ref 6–8.5)
SODIUM SERPL-SCNC: 136 MMOL/L (ref 136–145)
TRIGL SERPL-MCNC: 92 MG/DL (ref 0–150)
VLDLC SERPL-MCNC: 17 MG/DL (ref 5–40)

## 2023-07-12 LAB
AGE GDLN ACOG TESTING: NORMAL
CYTOLOGIST CVX/VAG CYTO: NORMAL
CYTOLOGY CVX/VAG DOC CYTO: NORMAL
CYTOLOGY CVX/VAG DOC THIN PREP: NORMAL
DX ICD CODE: NORMAL
HIV 1 & 2 AB SER-IMP: NORMAL
HPV GENOTYPE REFLEX: NORMAL
HPV I/H RISK 4 DNA CVX QL PROBE+SIG AMP: NEGATIVE
OTHER STN SPEC: NORMAL
STAT OF ADQ CVX/VAG CYTO-IMP: NORMAL

## 2023-07-14 ENCOUNTER — TELEPHONE (OUTPATIENT)
Dept: FAMILY MEDICINE CLINIC | Facility: CLINIC | Age: 58
End: 2023-07-14

## 2023-07-14 NOTE — TELEPHONE ENCOUNTER
Caller: Mariana Morrow    Relationship: Self    Best call back number: [phone]     What medication are you requesting: HORMONE MEDICATION (NEW)        If a prescription is needed, what is your preferred pharmacy and phone number: [facility] - [address] [phone] [facility] [phone]      Additional notes:      MARIANA WOULD LIKE TO KNOW IF DR PATINO PRESCRIBED THE HORMONE MEDICATION DISCUSSED AT LAST VISIT

## 2023-07-24 RX ORDER — ESTROGEN,CON/M-PROGEST ACET 0.3-1.5MG
1 TABLET ORAL DAILY
Qty: 90 TABLET | Refills: 0 | Status: SHIPPED | OUTPATIENT
Start: 2023-07-24 | End: 2023-07-27

## 2023-07-26 NOTE — TELEPHONE ENCOUNTER
Caller: [facility] - [place], [place] - [address] [phone] [facility] [phone]     Relationship: Pharmacy    Best call back number [phone] REF # [id number]    Which medication are you concerned about:estrogen, conjugated,-medroxyprogesterone (Prempro) 0.3-1.5 MG per tablet  (WILL  )    What are your concerns: ESTRADIAL NORETHINDRONE OR NORETHINDRONE / ETHINYL ES

## 2023-07-27 RX ORDER — MEDROXYPROGESTERONE ACETATE 2.5 MG/1
2.5 TABLET ORAL DAILY
Qty: 90 TABLET | Refills: 0 | Status: SHIPPED | OUTPATIENT
Start: 2023-07-27

## 2023-07-27 RX ORDER — ESTRADIOL 0.5 MG/1
0.5 TABLET ORAL DAILY
Qty: 90 TABLET | Refills: 0 | Status: SHIPPED | OUTPATIENT
Start: 2023-07-27

## 2023-07-30 PROBLEM — R78.81 BACTEREMIA DUE TO PSEUDOMONAS: Status: RESOLVED | Noted: 2019-09-23 | Resolved: 2023-07-30

## 2023-07-30 PROBLEM — B96.5 BACTEREMIA DUE TO PSEUDOMONAS: Status: RESOLVED | Noted: 2019-09-23 | Resolved: 2023-07-30

## 2023-07-30 PROBLEM — J06.9 URI (UPPER RESPIRATORY INFECTION): Status: RESOLVED | Noted: 2023-01-09 | Resolved: 2023-07-30

## 2023-07-30 PROBLEM — R78.81 BACTEREMIA DUE TO GRAM-NEGATIVE BACTERIA: Status: RESOLVED | Noted: 2019-09-23 | Resolved: 2023-07-30

## 2023-07-30 PROBLEM — A41.9 SEVERE SEPSIS: Status: RESOLVED | Noted: 2020-09-12 | Resolved: 2023-07-30

## 2023-07-30 PROBLEM — N10 ACUTE PYELONEPHRITIS: Status: RESOLVED | Noted: 2020-09-12 | Resolved: 2023-07-30

## 2023-07-30 PROBLEM — H66.90 ACUTE OTITIS MEDIA: Status: RESOLVED | Noted: 2023-01-25 | Resolved: 2023-07-30

## 2023-07-30 PROBLEM — R65.20 SEVERE SEPSIS: Status: RESOLVED | Noted: 2020-09-12 | Resolved: 2023-07-30

## 2023-08-16 RX ORDER — ESTRADIOL 0.5 MG/1
0.5 TABLET ORAL DAILY
Qty: 90 TABLET | Refills: 0 | Status: SHIPPED | OUTPATIENT
Start: 2023-08-16

## 2023-08-16 RX ORDER — MEDROXYPROGESTERONE ACETATE 2.5 MG/1
2.5 TABLET ORAL DAILY
Qty: 90 TABLET | Refills: 0 | Status: SHIPPED | OUTPATIENT
Start: 2023-08-16

## 2023-08-16 NOTE — TELEPHONE ENCOUNTER
Incoming Refill Request      Medication requested (name and dose): estradiol 0.5mg  Medroxyprogesterone 2.5mg    Pharmacy where request should be sent: [facility]    Additional details provided by patient: Requesting 90 day supply    Best call back number: n/a    Does the patient have less than a 3 day supply:  [] Yes  [x] No    Gege Melendrez Rep  08/16/23, 08:18 EDT

## 2023-10-24 ENCOUNTER — TRANSCRIBE ORDERS (OUTPATIENT)
Dept: ADMINISTRATIVE | Facility: HOSPITAL | Age: 58
End: 2023-10-24
Payer: COMMERCIAL

## 2023-10-24 DIAGNOSIS — Z12.31 SCREENING MAMMOGRAM FOR BREAST CANCER: Primary | ICD-10-CM

## 2023-10-26 RX ORDER — ESTRADIOL 0.5 MG/1
0.5 TABLET ORAL DAILY
Qty: 90 TABLET | Refills: 1 | Status: SHIPPED | OUTPATIENT
Start: 2023-10-26

## 2023-10-26 RX ORDER — MEDROXYPROGESTERONE ACETATE 2.5 MG/1
2.5 TABLET ORAL DAILY
Qty: 90 TABLET | Refills: 1 | Status: SHIPPED | OUTPATIENT
Start: 2023-10-26

## 2023-10-30 ENCOUNTER — HOSPITAL ENCOUNTER (OUTPATIENT)
Dept: MAMMOGRAPHY | Facility: HOSPITAL | Age: 58
Discharge: HOME | End: 2023-10-30
Admitting: FAMILY MEDICINE
Payer: COMMERCIAL

## 2023-10-30 DIAGNOSIS — Z12.31 SCREENING MAMMOGRAM FOR BREAST CANCER: ICD-10-CM

## 2023-10-30 PROCEDURE — 77067 SCR MAMMO BI INCL CAD: CPT

## 2023-10-30 PROCEDURE — 77063 BREAST TOMOSYNTHESIS BI: CPT

## 2024-01-02 RX ORDER — MONTELUKAST SODIUM 10 MG/1
10 TABLET ORAL DAILY
Qty: 90 TABLET | Refills: 3 | Status: SHIPPED | OUTPATIENT
Start: 2024-01-02

## 2024-02-26 RX ORDER — LEVETIRACETAM 250 MG/1
TABLET ORAL
Qty: 60 TABLET | Refills: 4 | Status: SHIPPED | OUTPATIENT
Start: 2024-02-26

## 2024-04-23 ENCOUNTER — OFFICE VISIT (OUTPATIENT)
Dept: NEUROLOGY | Facility: CLINIC | Age: 59
End: 2024-04-23
Payer: COMMERCIAL

## 2024-04-23 VITALS
DIASTOLIC BLOOD PRESSURE: 84 MMHG | BODY MASS INDEX: 36.7 KG/M2 | WEIGHT: 215 LBS | HEART RATE: 71 BPM | SYSTOLIC BLOOD PRESSURE: 132 MMHG | HEIGHT: 64 IN | OXYGEN SATURATION: 95 %

## 2024-04-23 DIAGNOSIS — G43.109 MIGRAINE WITH AURA AND WITHOUT STATUS MIGRAINOSUS, NOT INTRACTABLE: Primary | ICD-10-CM

## 2024-04-23 DIAGNOSIS — R56.9 GENERALIZED CONVULSIVE SEIZURES: ICD-10-CM

## 2024-04-23 PROCEDURE — 99214 OFFICE O/P EST MOD 30 MIN: CPT | Performed by: PSYCHIATRY & NEUROLOGY

## 2024-04-23 RX ORDER — FLUTICASONE PROPIONATE 50 MCG
SPRAY, SUSPENSION (ML) NASAL
COMMUNITY

## 2024-04-23 NOTE — PROGRESS NOTES
Chief Complaint   Patient presents with    Migraine    Seizure Like Activity       Patient ID: Stormy Morrow is a 58 y.o. female.    HPI: I had the pleasure of seeing your patient today.  As you may know she is a 58-year-old female here for the management of seizures and migraine headache.  She has been doing well with the Keppra.  No further seizure-like episodes.  She tolerates it well.  No side effects.  With respect to her migraines she has noted a slight increase per month.  She relates that to the barometric changes.  She is receiving Emgality injections q. monthly.  She uses naproxen for headache onset.  She has tried several medications in the past including triptan medications as well as Ubrelvy and Nurtec.  She denies any new symptoms neurologically today.    The following portions of the patient's history were reviewed and updated as appropriate: allergies, current medications, past family history, past medical history, past social history, past surgical history and problem list.    Review of Systems   Constitutional:  Positive for fatigue.   Eyes:  Positive for visual disturbance (seeing spots during migraines).   Neurological:  Positive for speech difficulty (with migraines), numbness and headaches. Negative for dizziness, tremors, seizures, syncope, facial asymmetry, weakness and light-headedness.   Psychiatric/Behavioral:  Positive for decreased concentration and sleep disturbance. Negative for agitation, behavioral problems, confusion, dysphoric mood, hallucinations, self-injury and suicidal ideas. The patient is hyperactive. The patient is not nervous/anxious.       I have reviewed the review of systems above performed by my medical assistant.      Vitals:    04/23/24 1359   BP: 132/84   Pulse: 71   SpO2: 95%       Neurologic Exam     Mental Status   Oriented to person, place, and time.   Concentration: normal.   Level of consciousness: alert  Knowledge: consistent with education (No deficits  found.).     Cranial Nerves     CN II   Visual fields full to confrontation.     CN III, IV, VI   Pupils are equal, round, and reactive to light.  Extraocular motions are normal.   CN III: no CN III palsy  CN VI: no CN VI palsy    CN V   Facial sensation intact.     CN VII   Facial expression full, symmetric.     CN VIII   CN VIII normal.     CN IX, X   CN IX normal.   CN X normal.     CN XI   CN XI normal.     CN XII   CN XII normal.     Motor Exam     Strength   Right neck flexion: 5/5  Left neck flexion: 5/5  Right neck extension: 5/5  Left neck extension: 5/5  Right deltoid: 5/5  Left deltoid: 5/5  Right biceps: 5/5  Left biceps: 5/5  Right triceps: 5/5  Left triceps: 5/5  Right wrist flexion: 5/5  Left wrist flexion: 5/5  Right wrist extension: 5/5  Left wrist extension: 5/5  Right interossei: 5/5  Left interossei: 5/5  Right abdominals: 5/5  Left abdominals: 5/5  Right iliopsoas: 5/5  Left iliopsoas: 5/5  Right quadriceps: 5/5  Left quadriceps: 5/5  Right hamstrin/5  Left hamstrin/5  Right glutei: 5/5  Left glutei: 5/5  Right anterior tibial: 5/5  Left anterior tibial: 5/5  Right posterior tibial: 5/5  Left posterior tibial: 5/5  Right peroneal: 5/5  Left peroneal: 5/5  Right gastroc: 5/5  Left gastroc: 5/5    Sensory Exam   Light touch normal.   Vibration normal.     Gait, Coordination, and Reflexes     Gait  Gait: normal    Reflexes   Right brachioradialis: 2+  Left brachioradialis: 2+  Right biceps: 2+  Left biceps: 2+  Right triceps: 2+  Left triceps: 2+  Right patellar: 2+  Left patellar: 2+  Right achilles: 2+  Left achilles: 2+  Right : 2+  Left : 2+Station is normal.       Physical Exam  Vitals reviewed.   Constitutional:       Appearance: She is well-developed.   HENT:      Head: Normocephalic and atraumatic.   Eyes:      Extraocular Movements: EOM normal.      Pupils: Pupils are equal, round, and reactive to light.   Cardiovascular:      Rate and Rhythm: Normal rate and regular  rhythm.   Pulmonary:      Breath sounds: Normal breath sounds.   Musculoskeletal:         General: Normal range of motion.   Skin:     General: Skin is warm.   Neurological:      Mental Status: She is oriented to person, place, and time.      Gait: Gait is intact.      Deep Tendon Reflexes:      Reflex Scores:       Tricep reflexes are 2+ on the right side and 2+ on the left side.       Bicep reflexes are 2+ on the right side and 2+ on the left side.       Brachioradialis reflexes are 2+ on the right side and 2+ on the left side.       Patellar reflexes are 2+ on the right side and 2+ on the left side.       Achilles reflexes are 2+ on the right side and 2+ on the left side.        Procedures    Assessment/Plan: We are going to continue the Emgality injections.  I have given her a sample of Zavzpret to try abortively.  Otherwise she will continue with naproxen.  Continue Keppra we will see her back in 6 months or sooner if needed.  A total of 30 minutes was spent face-to-face with the patient today.  Of that greater than 50% of this time was spent discussing signs and symptoms of migraines, seizures, patient education, plan of care and prognosis.         Diagnoses and all orders for this visit:    1. Migraine with aura and without status migrainosus, not intractable (Primary)    2. Generalized convulsive seizures           Chuy Dasilva II, MD

## 2024-05-28 RX ORDER — LEVETIRACETAM 250 MG/1
TABLET ORAL
Qty: 60 TABLET | Refills: 11 | Status: SHIPPED | OUTPATIENT
Start: 2024-05-28

## 2024-06-11 RX ORDER — MEDROXYPROGESTERONE ACETATE 2.5 MG/1
2.5 TABLET ORAL DAILY
Qty: 90 TABLET | Refills: 3 | Status: SHIPPED | OUTPATIENT
Start: 2024-06-11

## 2024-06-11 RX ORDER — ESTRADIOL 0.5 MG/1
0.5 TABLET ORAL DAILY
Qty: 90 TABLET | Refills: 3 | Status: SHIPPED | OUTPATIENT
Start: 2024-06-11

## 2024-06-18 DIAGNOSIS — G43.109 MIGRAINE WITH AURA, NOT INTRACTABLE, WITHOUT STATUS MIGRAINOSUS: ICD-10-CM

## 2024-06-19 RX ORDER — GALCANEZUMAB 120 MG/ML
INJECTION, SOLUTION SUBCUTANEOUS
Qty: 3 ML | Refills: 1 | Status: SHIPPED | OUTPATIENT
Start: 2024-06-19

## 2024-08-02 ENCOUNTER — TELEPHONE (OUTPATIENT)
Dept: NEUROLOGY | Facility: CLINIC | Age: 59
End: 2024-08-02
Payer: COMMERCIAL

## 2024-08-02 DIAGNOSIS — G43.109 MIGRAINE WITH AURA AND WITHOUT STATUS MIGRAINOSUS, NOT INTRACTABLE: ICD-10-CM

## 2024-08-02 DIAGNOSIS — G43.109 MIGRAINE WITH AURA, NOT INTRACTABLE, WITHOUT STATUS MIGRAINOSUS: Primary | ICD-10-CM

## 2024-08-02 DIAGNOSIS — G43.109 MIGRAINE WITH AURA, NOT INTRACTABLE, WITHOUT STATUS MIGRAINOSUS: ICD-10-CM

## 2024-08-02 NOTE — TELEPHONE ENCOUNTER
Caller: Stormy Morrow    Relationship: Self    Best call back number:   Telephone Information:   Mobile [phone]         Requested Prescriptions:   Requested Prescriptions     Pending Prescriptions Disp Refills    galcanezumab-gnlm (Emgality) 120 MG/ML auto-injector pen 3 mL 1        Pharmacy where request should be sent: [facility] [address] [phone] [facility] [phone]      Last office visit with prescribing clinician: 4/23/2024   Last telemedicine visit with prescribing clinician: Visit date not found   Next office visit with prescribing clinician: 11/11/2024     Additional details provided by patient: INJECTION WAS DUE ON 7-29-24    Does the patient have less than a 3 day supply:  [x] Yes  [] No    Would you like a call back once the refill request has been completed: [] Yes [x] No    If the office needs to give you a call back, can they leave a voicemail: [] Yes [x] No    Gege Rob   08/02/24 14:51 EDT

## 2024-08-02 NOTE — TELEPHONE ENCOUNTER
Caller: Stormy Morrow    Relationship: Self    Best call back number:   Telephone Information:   Mobile [phone]         What medication are you requesting: Zavzpret     What are your current symptoms: PT WAS ADVISED  TO CALL IF THIS WAS SOMETHING SHE WOULD LIKE FOR AN ABORTIVE MIGRAINE MEDICATION    Have you had these symptoms before:    [x] Yes  [] No    Have you been treated for these symptoms before:   [x] Yes  [] No    If a prescription is needed, what is your preferred pharmacy and phone number:    [facility] [address] [phone] [facility] [phone]

## 2024-08-05 RX ORDER — GALCANEZUMAB 120 MG/ML
120 INJECTION, SOLUTION SUBCUTANEOUS
Qty: 3 ML | Refills: 1 | Status: SHIPPED | OUTPATIENT
Start: 2024-08-05

## 2024-08-09 ENCOUNTER — TELEPHONE (OUTPATIENT)
Dept: NEUROLOGY | Facility: CLINIC | Age: 59
End: 2024-08-09

## 2024-08-09 NOTE — TELEPHONE ENCOUNTER
[facility]   Provider: DR AGEE    Caller: LETY WITH [facility]    Phone Number: [phone]     REF# [id number]     Reason for Call: CALLED REGARDING THE ZAVEGEPANT NASAL SPRAY. STATES IT IS NOT COVERED BY PATIENT'S INSURANCE, WANTED TO SEE IF AN ALTERNATIVE OF RIZATRIPTAN CAPSULES OR SUMATRIPTAN NASAL SPRAY WOULD BE ACCEPTABLE. STATES THEY SENT A FAX YESTERDAY & WOULD LIKE TO GET THIS RESOLVED FOR PATIENT ASAP. PLEASE REVIEW & ADVISE, THANK YOU.

## 2024-08-09 NOTE — TELEPHONE ENCOUNTER
Caller: Stormy Morrow    Relationship: Self    Best call back number: [phone]    What was the call regarding: PT CALLING TO NOTIFY THE OFFICE THAT HER INSURANCE IS REQUIRING A PRIOR AUTHORIZATION FOR FURTHER CONSIDERATION OF COVERAGE OF THE EMGALITY & ZAVEGEPANT MEDICATION. PT CALLING TO REQUEST THAT OFFICE INITIATE PRIOR AUTHORIZATION AND CONTACT HER WITH ANY STATUS UPDATES.    Do you require a callback: YES, PLEASE.    PLEASE REVIEW AND ADVISE.

## 2024-08-12 ENCOUNTER — SPECIALTY PHARMACY (OUTPATIENT)
Dept: NEUROLOGY | Facility: CLINIC | Age: 59
End: 2024-08-12
Payer: COMMERCIAL

## 2024-08-12 NOTE — TELEPHONE ENCOUNTER
It looks like they do each require a PA, we will take a look and keep pt/Dr. Dasilva posted. Thank you!

## 2024-08-16 NOTE — TELEPHONE ENCOUNTER
Spoke with patient on the phone. Zavzpret (zavegepant) approved and $0 with copay. Sent to patient's preferred pharmacy. Emgality prior authorization submitted and still pending. Patient is aware. We will continue to monitor status of prior authorization and follow-up with patient and provider when we hear back.     Thank you.

## 2024-09-20 ENCOUNTER — OFFICE VISIT (OUTPATIENT)
Dept: FAMILY MEDICINE CLINIC | Facility: CLINIC | Age: 59
End: 2024-09-20
Payer: COMMERCIAL

## 2024-09-20 VITALS
SYSTOLIC BLOOD PRESSURE: 114 MMHG | HEART RATE: 78 BPM | RESPIRATION RATE: 14 BRPM | HEIGHT: 64 IN | OXYGEN SATURATION: 97 % | TEMPERATURE: 97.5 F | DIASTOLIC BLOOD PRESSURE: 78 MMHG | BODY MASS INDEX: 37.39 KG/M2 | WEIGHT: 219 LBS

## 2024-09-20 DIAGNOSIS — Z23 NEED FOR INFLUENZA VACCINATION: ICD-10-CM

## 2024-09-20 DIAGNOSIS — J45.20 MILD INTERMITTENT ASTHMA WITHOUT COMPLICATION: ICD-10-CM

## 2024-09-20 DIAGNOSIS — E87.6 HYPOKALEMIA: ICD-10-CM

## 2024-09-20 DIAGNOSIS — Z13.220 ENCOUNTER FOR SCREENING FOR LIPID DISORDER: ICD-10-CM

## 2024-09-20 DIAGNOSIS — Z12.31 ENCOUNTER FOR SCREENING MAMMOGRAM FOR MALIGNANT NEOPLASM OF BREAST: ICD-10-CM

## 2024-09-20 DIAGNOSIS — Z23 IMMUNIZATION DUE: ICD-10-CM

## 2024-09-20 DIAGNOSIS — Z00.00 ANNUAL PHYSICAL EXAM: Primary | ICD-10-CM

## 2024-09-20 DIAGNOSIS — Z91.09 ENVIRONMENTAL ALLERGIES: ICD-10-CM

## 2024-09-20 LAB
BILIRUB BLD-MCNC: ABNORMAL MG/DL
CLARITY, POC: CLEAR
COLOR UR: YELLOW
EXPIRATION DATE: ABNORMAL
GLUCOSE UR STRIP-MCNC: NEGATIVE MG/DL
KETONES UR QL: NEGATIVE
LEUKOCYTE EST, POC: ABNORMAL
Lab: ABNORMAL
NITRITE UR-MCNC: NEGATIVE MG/ML
PH UR: 7 [PH] (ref 5–8)
PROT UR STRIP-MCNC: ABNORMAL MG/DL
RBC # UR STRIP: NEGATIVE /UL
SP GR UR: 1.01 (ref 1–1.03)
UROBILINOGEN UR QL: ABNORMAL

## 2024-09-20 PROCEDURE — 83735 ASSAY OF MAGNESIUM: CPT | Performed by: FAMILY MEDICINE

## 2024-09-20 PROCEDURE — 80061 LIPID PANEL: CPT | Performed by: FAMILY MEDICINE

## 2024-09-20 PROCEDURE — 80053 COMPREHEN METABOLIC PANEL: CPT | Performed by: FAMILY MEDICINE

## 2024-09-20 RX ORDER — EPINEPHRINE 0.3 MG/.3ML
0.3 INJECTION SUBCUTANEOUS ONCE AS NEEDED
Qty: 2 EACH | Refills: 1 | Status: SHIPPED | OUTPATIENT
Start: 2024-09-20

## 2024-09-20 RX ORDER — FLUTICASONE FUROATE 27.5 UG/1
2 SPRAY, METERED NASAL DAILY
Qty: 27.3 ML | Refills: 3 | Status: SHIPPED | OUTPATIENT
Start: 2024-09-20

## 2024-09-20 RX ORDER — MONTELUKAST SODIUM 10 MG/1
10 TABLET ORAL DAILY
Qty: 90 TABLET | Refills: 3 | Status: SHIPPED | OUTPATIENT
Start: 2024-09-20

## 2024-09-20 RX ORDER — ALBUTEROL SULFATE 90 UG/1
2 INHALANT RESPIRATORY (INHALATION) EVERY 6 HOURS PRN
Qty: 18 G | Refills: 0 | Status: SHIPPED | OUTPATIENT
Start: 2024-09-20

## 2024-09-20 NOTE — PROGRESS NOTES
Subjective   Stormy Morrow is a 59 y.o. female.     Chief Complaint   Patient presents with    Annual Exam     Physical w/o papsmear     Injections     Patient was given the Influenza and Shingrix injection while in the office today.          Current Outpatient Medications:     albuterol sulfate  (90 Base) MCG/ACT inhaler, Inhale 2 puffs Every 6 (Six) Hours As Needed for Wheezing or Shortness of Air., Disp: 18 g, Rfl: 0    Cetirizine HCl 10 MG capsule, Take 1 capsule by mouth Daily., Disp: 90 capsule, Rfl: 3    chlorthalidone (HYGROTON) 25 MG tablet, Take 0.5 tablets by mouth Daily., Disp: , Rfl:     EPINEPHrine (EPIPEN) 0.3 MG/0.3ML solution auto-injector injection, Inject 0.3 mL into the appropriate muscle as directed by prescriber 1 (One) Time As Needed (swelling/ corona)., Disp: 2 each, Rfl: 1    estradiol (ESTRACE) 0.5 MG tablet, TAKE 1 TABLET DAILY, Disp: 90 tablet, Rfl: 3    fluticasone (Flonase Sensimist) 27.5 MCG/SPRAY nasal spray, Administer 2 sprays into the nostril(s) as directed by provider Daily., Disp: 27.3 mL, Rfl: 3    levETIRAcetam (KEPPRA) 250 MG tablet, TAKE 1 TABLET TWICE A DAY, Disp: 60 tablet, Rfl: 11    Magnesium 100 MG capsule, 1 po qd prn, Disp: , Rfl:     medroxyPROGESTERone (PROVERA) 2.5 MG tablet, TAKE 1 TABLET DAILY, Disp: 90 tablet, Rfl: 3    montelukast (SINGULAIR) 10 MG tablet, Take 1 tablet by mouth Daily., Disp: 90 tablet, Rfl: 3    Nutritional Supplements (THERALITH XR) tablet, Take 2 tablets by mouth 2 (Two) Times a Day., Disp: , Rfl:     potassium chloride (K-DUR,KLOR-CON) 10 MEQ CR tablet, Take 1 tablet by mouth Daily., Disp: , Rfl:     galcanezumab-gnlm (Emgality) 120 MG/ML auto-injector pen, Inject 1 mL under the skin into the appropriate area as directed Every 30 (Thirty) Days., Disp: 3 mL, Rfl: 1    Zavegepant HCl 10 MG/ACT solution, Administer 10 mg into the nostril(s) as directed by provider 1 (One) Time As Needed (Migraine onset). Administer 1 spray (10 mg) once  into one nostril as needed. Max of 1 spray (10 mg) per 24 hours., Disp: 6 each, Rfl: 3    Past Medical History:   Diagnosis Date    Allergic     Asthma 11/21/2019    Bell palsy     Rt Side    Heart murmur     Hypertension     Kidney stone     MAMMO     NEG = 2018/ 2019/ 2021/ 2022/ 2023    Migraine     PAP     NEG = 2018/ 2019/ 2022/ 2023    Raynaud phenomenon     Seizures        Past Surgical History:   Procedure Laterality Date    APPENDECTOMY  07/2000    BOTOX INJECTION  11/2023    COLONOSCOPY      Cologuard ----2019/ 2023= NEG, rech 2026    CYSTOSCOPY BLADDER STONE LITHOTRIPSY  2019/ 2020    x 2    CYSTOSCOPY W/ LASER LITHOTRIPSY  2022       Family History   Problem Relation Age of Onset    Arthritis Mother     Osteoporosis Mother     Stroke Mother     Hypertension Mother     Miscarriages / Stillbirths Mother         Miscarriage    COPD Mother     Arthritis Father     Mental illness Father         Ptsd combat  medic    Hyperlipidemia Father     Kidney disease Father         Renal tubular acidosis & kidney stones    Obesity Father     Hypertension Father     Kidney nephrosis Father     Tongue cancer Father     Hypertension Brother     Kidney nephrosis Brother     Arthritis Maternal Grandmother     Cancer Maternal Grandmother     Obesity Maternal Grandmother     Breast cancer Maternal Grandmother     Cancer Maternal Grandfather     Stroke Paternal Grandfather         Age 76    Migraines Daughter     Depression Daughter     Hyperlipidemia Son     Kidney disease Son         Kidney stones    Migraines Maternal Aunt     Hyperlipidemia Maternal Uncle     Heart attack Maternal Uncle        Social History     Socioeconomic History    Marital status:    Tobacco Use    Smoking status: Never     Passive exposure: Never    Smokeless tobacco: Never    Tobacco comments:     Second hand smoke parents   Vaping Use    Vaping status: Never Used   Substance and Sexual Activity    Alcohol use: Never    Drug use: Never     Sexual activity: Yes     Partners: Male     Birth control/protection: Post-menopausal       History of Present Illness  The patient is a 59-year-old  female here for an annual physical w/o Pap.    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 She has renal tubular acidosis and has undergone lithotripsy. She maintains hydration and takes chlorthalidone, potassium, and magnesium for her condition. She also experiences leg cramps and has kidney stones on her left side.    She is currently on Cipro, which she reports causes palpitations, hair loss, and anxiety.    She has been diagnosed with cataracts and has a skin tag on the back of her thigh. She occasionally experiences gas.    She is on Provera, which she reports is effective. She has received Botox treatment for Bell's palsy.    SOCIAL HISTORY  She is a non-smoker, no alcohol, no drugs.    FAMILY HISTORY  Her grandmother had melanoma.    ALLERGIES  She is allergic to PREDNISONE, HYDROCODONE, PREDNISOLONE, CIPRO, LATEX, and PERCOCET.        The following portions of the patient's history were reviewed and updated as appropriate: allergies, current medications, past family history, past medical history, past social history, past surgical history and problem list.    Review of Systems   Constitutional:  Negative for activity change, appetite change, fatigue, unexpected weight gain and unexpected weight loss.   HENT:  Negative for congestion, ear pain, hearing loss, nosebleeds, postnasal drip, rhinorrhea, sinus pressure, sneezing, sore throat, tinnitus and trouble swallowing.    Eyes:  Negative for blurred vision and double vision.   Respiratory:  Negative for cough and shortness of breath.    Cardiovascular:  Negative for chest pain.   Gastrointestinal:  Negative for abdominal pain, constipation, diarrhea, nausea, vomiting, GERD and indigestion.   Genitourinary:  Negative for difficulty urinating, dysuria, flank  pain, frequency, urgency and urinary incontinence.   Musculoskeletal:  Positive for myalgias. Negative for arthralgias.   Skin:  Negative for rash and skin lesions.   Neurological:  Negative for weakness, memory problem and confusion.   Psychiatric/Behavioral:  Negative for agitation, self-injury, suicidal ideas, depressed mood and stress. The patient is not nervous/anxious.        Vitals:    09/20/24 0905   BP: 114/78   Pulse: 78   Resp: 14   Temp: 97.5 °F (36.4 °C)   SpO2: 97%       Objective   Physical Exam  Vitals and nursing note reviewed.   Constitutional:       General: She is not in acute distress.     Appearance: Normal appearance. She is well-developed. She is not ill-appearing, toxic-appearing or diaphoretic.   HENT:      Head: Normocephalic and atraumatic.      Right Ear: Tympanic membrane, ear canal and external ear normal. There is no impacted cerumen.      Left Ear: Tympanic membrane, ear canal and external ear normal. There is no impacted cerumen.      Nose: Nose normal. No congestion or rhinorrhea.      Mouth/Throat:      Mouth: Mucous membranes are moist.      Pharynx: No oropharyngeal exudate or posterior oropharyngeal erythema.   Eyes:      Extraocular Movements: Extraocular movements intact.      Conjunctiva/sclera: Conjunctivae normal.      Pupils: Pupils are equal, round, and reactive to light.   Cardiovascular:      Rate and Rhythm: Normal rate and regular rhythm.      Heart sounds: Normal heart sounds. No murmur heard.  Pulmonary:      Effort: Pulmonary effort is normal. No respiratory distress.      Breath sounds: Normal breath sounds. No wheezing, rhonchi or rales.   Chest:   Breasts:     Right: Normal.      Left: Normal.   Abdominal:      General: Bowel sounds are normal. There is no distension.      Palpations: Abdomen is soft. There is no mass.      Tenderness: There is no abdominal tenderness. There is no guarding or rebound.      Hernia: No hernia is present.   Musculoskeletal:          General: Normal range of motion.      Cervical back: Normal range of motion and neck supple.      Right lower leg: No edema.      Left lower leg: No edema.   Lymphadenopathy:      Cervical: No cervical adenopathy.      Upper Body:      Right upper body: No supraclavicular or axillary adenopathy.      Left upper body: No supraclavicular or axillary adenopathy.   Skin:     General: Skin is warm and dry.      Findings: No rash.   Neurological:      General: No focal deficit present.      Mental Status: She is alert and oriented to person, place, and time.      Cranial Nerves: No cranial nerve deficit.   Psychiatric:         Attention and Perception: Attention and perception normal.         Mood and Affect: Mood and affect normal.         Speech: Speech normal.         Behavior: Behavior normal. Behavior is cooperative.         Thought Content: Thought content normal.         Cognition and Memory: Cognition and memory normal.         Judgment: Judgment normal.       Physical Exam  Vital Signs  LT=601/78.     Class 2 Severe Obesity (BMI >=35 and <=39.9). Obesity-related health conditions include the following: hypertension. Obesity is improving with treatment. BMI is is above average; BMI management plan is completed. We discussed portion control and increasing exercise.      Assessment & Plan   Diagnoses and all orders for this visit:    1. Annual physical exam (Primary)  -     POC Urinalysis Dipstick, Automated  -     Mammo Screening Digital Tomosynthesis Bilateral With CAD; Future  -     Lipid Panel  -     Comprehensive Metabolic Panel    2. Hypokalemia  -     Magnesium    3. Mild intermittent asthma without complication    4. Environmental allergies    5. Encounter for screening mammogram for malignant neoplasm of breast  -     Mammo Screening Digital Tomosynthesis Bilateral With CAD; Future    6. Immunization due  -     Shingrix Vaccine    7. Need for influenza vaccination  -     Fluzone >6mos    8. Encounter for  screening for lipid disorder  -     Lipid Panel  -     Comprehensive Metabolic Panel    Other orders  -     EPINEPHrine (EPIPEN) 0.3 MG/0.3ML solution auto-injector injection; Inject 0.3 mL into the appropriate muscle as directed by prescriber 1 (One) Time As Needed (swelling/ corona).  Dispense: 2 each; Refill: 1  -     montelukast (SINGULAIR) 10 MG tablet; Take 1 tablet by mouth Daily.  Dispense: 90 tablet; Refill: 3  -     albuterol sulfate  (90 Base) MCG/ACT inhaler; Inhale 2 puffs Every 6 (Six) Hours As Needed for Wheezing or Shortness of Air.  Dispense: 18 g; Refill: 0  -     fluticasone (Flonase Sensimist) 27.5 MCG/SPRAY nasal spray; Administer 2 sprays into the nostril(s) as directed by provider Daily.  Dispense: 27.3 mL; Refill: 3      Assessment & Plan  1. Annual Physical Examination.  Her blood pressure is 114/78, which is within the normal range. The most recent Pap smear conducted in 2023 showed no abnormalities. She opted to skip the Pap smear this year. A mammogram is due and will be scheduled at [facility]. Fasting labs will be conducted today, including a check of her magnesium levels.     2. Renal Tubular Acidosis.  She continues to experience kidney stones, particularly on her left side. She is advised to maintain adequate hydration and continue taking chlorthalidone, potassium, and magnesium supplements. The possibility of adjusting her potassium intake will be considered based on today's lab results.    3. Allergies.  She has multiple allergies, including prednisone, hydrocodone (causes rash), prednisolone, latex, and Percocet (manageable with Benadryl). Cipro was removed from her allergy list as it causes side effects but is not life-threatening.    4. Asthma.  She uses a rescue inhaler and has requested a refill. The prescription for the EpiPen will be sent to [facility].    5. Migraines.  She uses Emgality monthly and has a new prescription for a migraine abortive medication, which she  has previously tried with some success.     6. Seizure-like Activities.  She is on Keppra 250 mg twice a day, prescribed by her neurologist.    7. Hormone Replacement Therapy.  She is on estradiol 0.5 mg and Provera 2.5 mg, which she reports are working well for her.    8. Health Maintenance.  She is advised to receive the shingles vaccine and influenza vaccine today. The new COVID-19 booster and RSV vaccine are available at the pharmacy, and she is encouraged to get them due to her asthma history.        Results  Laboratory Studies  LDL was a little off last year. Potassium is still on the low side.          Patient or patient representative verbalized consent for the use of Ambient Listening during the visit with  Nicole Tracy DO for chart documentation. 10/13/2024  09:42 EDT

## 2024-09-20 NOTE — PROGRESS NOTES
Venipuncture performed in R HAND\ by RT Suraj  with good hemostasis. Patient tolerated well. 09/20/24 Starla Pace, RT

## 2024-09-21 ENCOUNTER — PATIENT MESSAGE (OUTPATIENT)
Dept: NEUROLOGY | Facility: CLINIC | Age: 59
End: 2024-09-21
Payer: COMMERCIAL

## 2024-09-21 LAB
ALBUMIN SERPL-MCNC: 4.3 G/DL (ref 3.5–5.2)
ALBUMIN/GLOB SERPL: 1.6 G/DL
ALP SERPL-CCNC: 93 U/L (ref 39–117)
ALT SERPL W P-5'-P-CCNC: 19 U/L (ref 1–33)
ANION GAP SERPL CALCULATED.3IONS-SCNC: 11 MMOL/L (ref 5–15)
AST SERPL-CCNC: 20 U/L (ref 1–32)
BILIRUB SERPL-MCNC: 0.6 MG/DL (ref 0–1.2)
BUN SERPL-MCNC: 14 MG/DL (ref 6–20)
BUN/CREAT SERPL: 15.7 (ref 7–25)
CALCIUM SPEC-SCNC: 9.7 MG/DL (ref 8.6–10.5)
CHLORIDE SERPL-SCNC: 104 MMOL/L (ref 98–107)
CHOLEST SERPL-MCNC: 182 MG/DL (ref 0–200)
CO2 SERPL-SCNC: 26 MMOL/L (ref 22–29)
CREAT SERPL-MCNC: 0.89 MG/DL (ref 0.57–1)
EGFRCR SERPLBLD CKD-EPI 2021: 75.7 ML/MIN/1.73
GLOBULIN UR ELPH-MCNC: 2.7 GM/DL
GLUCOSE SERPL-MCNC: 93 MG/DL (ref 65–99)
HDLC SERPL-MCNC: 52 MG/DL (ref 40–60)
LDLC SERPL CALC-MCNC: 119 MG/DL (ref 0–100)
LDLC/HDLC SERPL: 2.27 {RATIO}
MAGNESIUM SERPL-MCNC: 2.3 MG/DL (ref 1.6–2.6)
POTASSIUM SERPL-SCNC: 3.9 MMOL/L (ref 3.5–5.2)
PROT SERPL-MCNC: 7 G/DL (ref 6–8.5)
SODIUM SERPL-SCNC: 141 MMOL/L (ref 136–145)
TRIGL SERPL-MCNC: 60 MG/DL (ref 0–150)
VLDLC SERPL-MCNC: 11 MG/DL (ref 5–40)

## 2024-09-23 DIAGNOSIS — G43.109 MIGRAINE WITH AURA AND WITHOUT STATUS MIGRAINOSUS, NOT INTRACTABLE: ICD-10-CM

## 2024-09-23 DIAGNOSIS — G43.109 MIGRAINE WITH AURA, NOT INTRACTABLE, WITHOUT STATUS MIGRAINOSUS: ICD-10-CM

## 2024-09-23 RX ORDER — GALCANEZUMAB 120 MG/ML
120 INJECTION, SOLUTION SUBCUTANEOUS
Qty: 3 ML | Refills: 1 | Status: SHIPPED | OUTPATIENT
Start: 2024-09-23 | End: 2024-09-24

## 2024-09-24 RX ORDER — GALCANEZUMAB 120 MG/ML
120 INJECTION, SOLUTION SUBCUTANEOUS
Qty: 3 ML | Refills: 1 | Status: SHIPPED | OUTPATIENT
Start: 2024-09-24

## 2024-09-25 ENCOUNTER — TELEPHONE (OUTPATIENT)
Dept: NEUROLOGY | Facility: CLINIC | Age: 59
End: 2024-09-25
Payer: COMMERCIAL

## 2024-10-29 DIAGNOSIS — G43.109 MIGRAINE WITH AURA, NOT INTRACTABLE, WITHOUT STATUS MIGRAINOSUS: ICD-10-CM

## 2024-10-29 DIAGNOSIS — G43.109 MIGRAINE WITH AURA AND WITHOUT STATUS MIGRAINOSUS, NOT INTRACTABLE: ICD-10-CM

## 2024-11-01 ENCOUNTER — HOSPITAL ENCOUNTER (OUTPATIENT)
Dept: MAMMOGRAPHY | Facility: HOSPITAL | Age: 59
Discharge: HOME | End: 2024-11-01
Admitting: FAMILY MEDICINE
Payer: COMMERCIAL

## 2024-11-01 DIAGNOSIS — Z00.00 ANNUAL PHYSICAL EXAM: ICD-10-CM

## 2024-11-01 DIAGNOSIS — Z12.31 ENCOUNTER FOR SCREENING MAMMOGRAM FOR MALIGNANT NEOPLASM OF BREAST: ICD-10-CM

## 2024-11-01 PROCEDURE — 77063 BREAST TOMOSYNTHESIS BI: CPT

## 2024-11-01 PROCEDURE — 77067 SCR MAMMO BI INCL CAD: CPT

## 2024-11-11 ENCOUNTER — OFFICE VISIT (OUTPATIENT)
Dept: NEUROLOGY | Facility: CLINIC | Age: 59
End: 2024-11-11
Payer: COMMERCIAL

## 2024-11-11 VITALS
SYSTOLIC BLOOD PRESSURE: 126 MMHG | HEIGHT: 64 IN | OXYGEN SATURATION: 96 % | WEIGHT: 214 LBS | BODY MASS INDEX: 36.54 KG/M2 | HEART RATE: 85 BPM | DIASTOLIC BLOOD PRESSURE: 86 MMHG

## 2024-11-11 DIAGNOSIS — G43.109 MIGRAINE WITH AURA, NOT INTRACTABLE, WITHOUT STATUS MIGRAINOSUS: Primary | ICD-10-CM

## 2024-11-11 DIAGNOSIS — R56.9 GENERALIZED CONVULSIVE SEIZURES: ICD-10-CM

## 2024-11-11 PROCEDURE — 99214 OFFICE O/P EST MOD 30 MIN: CPT | Performed by: PSYCHIATRY & NEUROLOGY

## 2024-11-11 RX ORDER — PHENAZOPYRIDINE HYDROCHLORIDE 200 MG/1
200 TABLET, FILM COATED ORAL 3 TIMES DAILY PRN
COMMUNITY

## 2024-11-11 RX ORDER — ZAVEGEPANT 10 MG/.1ML
10 SPRAY NASAL DAILY PRN
Qty: 1 EACH | Refills: 0 | COMMUNITY
Start: 2024-11-11

## 2024-11-11 NOTE — PROGRESS NOTES
Chief Complaint   Patient presents with    Migraine with aura and without status migrainosus, not intr    Serizure like Activities       Patient ID: Stormy Morrow is a 59 y.o. female.    HPI:  I had the pleasure of seeing your patient today.  As you may know she is a 59-year-old female here for the management of migraine headaches.  She has had about 5 or so days of headache within the last 30 days.  She typically has headache that initiates in the occipital head region however she usually will have a prodrome of spots in her visual fields followed by severe head pain.  She will have sensitivity to light and sound.  At its worst it is usually holocephalic and 10 out of 10.  Currently she receives Emgality injections monthly which has drastically reduced the frequency and severity.  Abortively she has a prescription for Zavzpret which is very effective.      The following portions of the patient's history were reviewed and updated as appropriate: allergies, current medications, past family history, past medical history, past social history, past surgical history and problem list.    Review of Systems   Constitutional:  Positive for fatigue.   Eyes:  Positive for photophobia (during migraines), redness (during migraines) and visual disturbance (during migraines).   Gastrointestinal:  Positive for nausea (occasionally during migraines).   Neurological:  Positive for weakness, light-headedness, numbness and headaches. Negative for dizziness, tremors, seizures, syncope, facial asymmetry and speech difficulty.   Psychiatric/Behavioral:  Positive for agitation, confusion, decreased concentration, dysphoric mood and sleep disturbance. Negative for behavioral problems, hallucinations, self-injury and suicidal ideas. The patient is not nervous/anxious and is not hyperactive.       I have reviewed the review of systems above performed by my medical assistant.      Vitals:    11/11/24 1315   BP: 126/86   Pulse: 85   SpO2:  96%       Neurological Exam  Mental Status  Awake, alert and oriented to person, place and time. Recent and remote memory are intact. Speech is normal. Language is fluent with no aphasia. Attention and concentration are normal. Fund of knowledge is appropriate for level of education.    Cranial Nerves  CN I: Sense of smell is normal.  CN II: Visual acuity is normal.  CN III, IV, VI: Extraocular movements intact bilaterally. Pupils equal round and reactive to light bilaterally.  CN V: Facial sensation is normal.  CN VII: Full and symmetric facial movement.  CN XI: Shoulder shrug strength is normal.  CN XII: Tongue midline without atrophy or fasciculations.    Motor  Normal muscle bulk throughout. No fasciculations present. Normal muscle tone. No abnormal involuntary movements. No pronator drift.                                             Right                     Left  Rhomboids                            5                          5  Infraspinatus                          5                          5  Supraspinatus                       5                          5  Deltoid                                   5                          5   Biceps                                   5                          5  Brachioradialis                      5                          5   Triceps                                  5                          5   Pronator                                5                          5   Supinator                              5                           5   Wrist flexor                            5                          5   Wrist extensor                       5                          5   Finger flexor                          5                          5   Finger extensor                     5                          5   Interossei                              5                          5   Abductor pollicis brevis         5                          5   Flexor pollicis brevis              5                          5   Opponens pollicis                 5                          5  Extensor digitorum               5                          5  Abductor digiti minimi           5                          5   Abdominal                            5                          5  Glutei                                    5                          5  Hip abductor                         5                          5  Hip adductor                         5                          5   Iliopsoas                               5                          5   Quadriceps                           5                          5   Hamstring                             5                          5   Gastrocnemius                     5                           5   Anterior tibialis                      5                          5   Posterior tibialis                    5                          5   Peroneal                               5                          5  Ankle dorsiflexor                   5                          5  Ankle plantar flexor              5                           5  Extensor hallucis longus      5                           5    Sensory  Sensation is intact to light touch, pinprick, vibration and proprioception in all four extremities.    Reflexes  Deep tendon reflexes are 2+ and symmetric in all four extremities.    Right pathological reflexes: Sanna's absent.  Left pathological reflexes: Sanna's absent.    Coordination    Finger-to-nose, rapid alternating movements and heel-to-shin normal bilaterally without dysmetria.    Gait  Normal casual, toe, heel and tandem gait.       Physical Exam  Vitals reviewed.   Constitutional:       Appearance: She is well-developed.   HENT:      Head: Normocephalic and atraumatic.   Eyes:      Extraocular Movements: Extraocular movements intact.      Pupils: Pupils are equal, round, and reactive to light.   Cardiovascular:      Rate and Rhythm:  Normal rate and regular rhythm.   Pulmonary:      Breath sounds: Normal breath sounds.   Musculoskeletal:         General: Normal range of motion.   Skin:     General: Skin is warm.   Neurological:      Coordination: Coordination is intact.      Deep Tendon Reflexes: Reflexes are normal and symmetric.   Psychiatric:         Speech: Speech normal.         Procedures    Assessment/Plan:  We are going to continue with the Emgality injections q. monthly and the Zavzpret abortively.  We will see her back in 6 months or sooner if needed.  A total of 30 minutes was spent face-to-face with the patient today.  Of that greater than 50% of this time was spent discussing signs and symptoms of migraines and seizures, patient education, plan of care and prognosis.         Diagnoses and all orders for this visit:    1. Migraine with aura, not intractable, without status migrainosus (Primary)    2. Generalized convulsive seizures           Chuy Dasilva II, MD

## 2025-02-17 DIAGNOSIS — G43.109 MIGRAINE WITH AURA, NOT INTRACTABLE, WITHOUT STATUS MIGRAINOSUS: ICD-10-CM

## 2025-02-18 RX ORDER — MONTELUKAST SODIUM 10 MG/1
10 TABLET ORAL DAILY
Qty: 90 TABLET | Refills: 3 | Status: SHIPPED | OUTPATIENT
Start: 2025-02-18

## 2025-02-18 RX ORDER — GALCANEZUMAB 120 MG/ML
120 INJECTION, SOLUTION SUBCUTANEOUS
Qty: 3 ML | Refills: 1 | Status: SHIPPED | OUTPATIENT
Start: 2025-02-18

## 2025-04-07 RX ORDER — ESTRADIOL 0.5 MG/1
0.5 TABLET ORAL DAILY
Qty: 90 TABLET | Refills: 0 | Status: SHIPPED | OUTPATIENT
Start: 2025-04-07

## 2025-04-07 RX ORDER — MEDROXYPROGESTERONE ACETATE 2.5 MG/1
2.5 TABLET ORAL DAILY
Qty: 90 TABLET | Refills: 0 | Status: SHIPPED | OUTPATIENT
Start: 2025-04-07

## 2025-04-30 ENCOUNTER — OFFICE VISIT (OUTPATIENT)
Dept: NEUROLOGY | Facility: CLINIC | Age: 60
End: 2025-04-30
Payer: COMMERCIAL

## 2025-04-30 VITALS
SYSTOLIC BLOOD PRESSURE: 144 MMHG | DIASTOLIC BLOOD PRESSURE: 84 MMHG | BODY MASS INDEX: 36.19 KG/M2 | WEIGHT: 212 LBS | HEART RATE: 71 BPM | OXYGEN SATURATION: 97 % | HEIGHT: 64 IN

## 2025-04-30 DIAGNOSIS — G43.109 MIGRAINE WITH AURA, NOT INTRACTABLE, WITHOUT STATUS MIGRAINOSUS: Primary | ICD-10-CM

## 2025-04-30 DIAGNOSIS — R56.9 GENERALIZED CONVULSIVE SEIZURES: ICD-10-CM

## 2025-04-30 PROCEDURE — 99214 OFFICE O/P EST MOD 30 MIN: CPT | Performed by: PSYCHIATRY & NEUROLOGY

## 2025-04-30 RX ORDER — PROPRANOLOL HYDROCHLORIDE 40 MG/1
40 TABLET ORAL 2 TIMES DAILY
Qty: 60 TABLET | Refills: 5 | Status: SHIPPED | OUTPATIENT
Start: 2025-04-30 | End: 2025-05-01

## 2025-05-01 DIAGNOSIS — G43.109 MIGRAINE WITH AURA, NOT INTRACTABLE, WITHOUT STATUS MIGRAINOSUS: ICD-10-CM

## 2025-05-01 RX ORDER — PROPRANOLOL HYDROCHLORIDE 40 MG/1
40 TABLET ORAL 2 TIMES DAILY
Qty: 60 TABLET | Refills: 5 | Status: CANCELLED | OUTPATIENT
Start: 2025-05-01 | End: 2025-05-31

## 2025-05-01 RX ORDER — PROPRANOLOL HYDROCHLORIDE 40 MG/1
40 TABLET ORAL 2 TIMES DAILY
Qty: 60 TABLET | Refills: 5 | Status: SHIPPED | OUTPATIENT
Start: 2025-05-01 | End: 2025-05-31

## 2025-06-03 DIAGNOSIS — G43.109 MIGRAINE WITH AURA, NOT INTRACTABLE, WITHOUT STATUS MIGRAINOSUS: ICD-10-CM

## 2025-06-04 RX ORDER — MEDROXYPROGESTERONE ACETATE 2.5 MG/1
2.5 TABLET ORAL DAILY
Qty: 90 TABLET | Refills: 0 | Status: SHIPPED | OUTPATIENT
Start: 2025-06-04

## 2025-06-04 RX ORDER — ESTRADIOL 0.5 MG/1
0.5 TABLET ORAL DAILY
Qty: 90 TABLET | Refills: 0 | Status: SHIPPED | OUTPATIENT
Start: 2025-06-04

## 2025-06-04 RX ORDER — GALCANEZUMAB 120 MG/ML
INJECTION, SOLUTION SUBCUTANEOUS
Qty: 3 ML | Refills: 3 | Status: SHIPPED | OUTPATIENT
Start: 2025-06-04

## 2025-08-08 ENCOUNTER — OFFICE VISIT (OUTPATIENT)
Dept: FAMILY MEDICINE CLINIC | Facility: CLINIC | Age: 60
End: 2025-08-08
Payer: COMMERCIAL

## 2025-08-08 VITALS
TEMPERATURE: 98.2 F | OXYGEN SATURATION: 98 % | BODY MASS INDEX: 37.56 KG/M2 | HEART RATE: 93 BPM | WEIGHT: 220 LBS | HEIGHT: 64 IN | SYSTOLIC BLOOD PRESSURE: 144 MMHG | DIASTOLIC BLOOD PRESSURE: 81 MMHG | RESPIRATION RATE: 16 BRPM

## 2025-08-08 DIAGNOSIS — G43.109 MIGRAINE WITH AURA, NOT INTRACTABLE, WITHOUT STATUS MIGRAINOSUS: ICD-10-CM

## 2025-08-08 DIAGNOSIS — R21 RASH AND NONSPECIFIC SKIN ERUPTION: Primary | ICD-10-CM

## 2025-08-08 DIAGNOSIS — R03.0 ELEVATED BLOOD PRESSURE READING WITHOUT DIAGNOSIS OF HYPERTENSION: ICD-10-CM

## 2025-08-08 DIAGNOSIS — R56.9 GENERALIZED CONVULSIVE SEIZURES: ICD-10-CM

## 2025-08-08 PROCEDURE — 99213 OFFICE O/P EST LOW 20 MIN: CPT | Performed by: FAMILY MEDICINE

## 2025-08-08 RX ORDER — BETAMETHASONE DIPROPIONATE 0.5 MG/G
1 CREAM TOPICAL 2 TIMES DAILY PRN
Qty: 45 G | Refills: 0 | Status: SHIPPED | OUTPATIENT
Start: 2025-08-08

## 2025-08-08 RX ORDER — METHYLPREDNISOLONE 16 MG/1
TABLET ORAL
Qty: 11 TABLET | Refills: 0 | Status: SHIPPED | OUTPATIENT
Start: 2025-08-08

## 2025-08-08 RX ORDER — BETAMETHASONE DIPROPIONATE 0.5 MG/G
1 CREAM TOPICAL 2 TIMES DAILY PRN
Qty: 45 G | Refills: 0 | Status: SHIPPED | OUTPATIENT
Start: 2025-08-08 | End: 2025-08-08 | Stop reason: SDUPTHER

## 2025-08-08 RX ORDER — RISPERIDONE 0.5 MG/1
TABLET ORAL
Qty: 20 TABLET | Refills: 0 | Status: SHIPPED | OUTPATIENT
Start: 2025-08-08

## 2025-08-11 ENCOUNTER — OFFICE VISIT (OUTPATIENT)
Dept: NEUROLOGY | Facility: CLINIC | Age: 60
End: 2025-08-11
Payer: COMMERCIAL

## 2025-08-11 VITALS
SYSTOLIC BLOOD PRESSURE: 114 MMHG | DIASTOLIC BLOOD PRESSURE: 74 MMHG | OXYGEN SATURATION: 97 % | WEIGHT: 218 LBS | HEIGHT: 64 IN | HEART RATE: 73 BPM | BODY MASS INDEX: 37.22 KG/M2

## 2025-08-11 DIAGNOSIS — G43.109 MIGRAINE WITH AURA, NOT INTRACTABLE, WITHOUT STATUS MIGRAINOSUS: Primary | ICD-10-CM

## 2025-08-11 DIAGNOSIS — R56.9 GENERALIZED CONVULSIVE SEIZURES: ICD-10-CM

## 2025-08-11 PROCEDURE — 99214 OFFICE O/P EST MOD 30 MIN: CPT | Performed by: PSYCHIATRY & NEUROLOGY

## 2025-08-11 RX ORDER — ESTRADIOL 0.5 MG/1
0.5 TABLET ORAL DAILY
Qty: 90 TABLET | Refills: 0 | Status: SHIPPED | OUTPATIENT
Start: 2025-08-11

## 2025-08-11 RX ORDER — MEDROXYPROGESTERONE ACETATE 2.5 MG/1
2.5 TABLET ORAL DAILY
Qty: 90 TABLET | Refills: 0 | Status: SHIPPED | OUTPATIENT
Start: 2025-08-11

## 2025-08-11 RX ORDER — PROPRANOLOL HYDROCHLORIDE 40 MG/1
40 TABLET ORAL 2 TIMES DAILY
Qty: 180 TABLET | Refills: 3 | OUTPATIENT
Start: 2025-08-11

## 2025-08-11 RX ORDER — VERAPAMIL HYDROCHLORIDE 40 MG/1
40 TABLET ORAL 2 TIMES DAILY
Qty: 60 TABLET | Refills: 4 | Status: SHIPPED | OUTPATIENT
Start: 2025-08-11 | End: 2025-09-10

## 2025-08-15 ENCOUNTER — SPECIALTY PHARMACY (OUTPATIENT)
Dept: NEUROLOGY | Facility: CLINIC | Age: 60
End: 2025-08-15
Payer: COMMERCIAL

## 2025-08-20 ENCOUNTER — SPECIALTY PHARMACY (OUTPATIENT)
Dept: NEUROLOGY | Facility: CLINIC | Age: 60
End: 2025-08-20
Payer: COMMERCIAL